# Patient Record
Sex: MALE | Race: WHITE | NOT HISPANIC OR LATINO | Employment: OTHER | URBAN - METROPOLITAN AREA
[De-identification: names, ages, dates, MRNs, and addresses within clinical notes are randomized per-mention and may not be internally consistent; named-entity substitution may affect disease eponyms.]

---

## 2017-06-20 ENCOUNTER — LAB (OUTPATIENT)
Dept: LAB | Facility: HOSPITAL | Age: 72
End: 2017-06-20

## 2017-06-20 ENCOUNTER — TRANSCRIBE ORDERS (OUTPATIENT)
Dept: LAB | Facility: HOSPITAL | Age: 72
End: 2017-06-20

## 2017-06-20 DIAGNOSIS — E78.2 MIXED HYPERLIPIDEMIA: ICD-10-CM

## 2017-06-20 DIAGNOSIS — E13.8 DIABETES MELLITUS OF OTHER TYPE WITH COMPLICATION: Primary | ICD-10-CM

## 2017-06-20 DIAGNOSIS — E13.8 DIABETES MELLITUS OF OTHER TYPE WITH COMPLICATION: ICD-10-CM

## 2017-06-20 LAB
ALBUMIN SERPL-MCNC: 4.2 G/DL (ref 3.2–4.8)
ALBUMIN/GLOB SERPL: 1.6 G/DL (ref 1.5–2.5)
ALP SERPL-CCNC: 58 U/L (ref 25–100)
ALT SERPL W P-5'-P-CCNC: 20 U/L (ref 7–40)
ANION GAP SERPL CALCULATED.3IONS-SCNC: 7 MMOL/L (ref 3–11)
ARTICHOKE IGE QN: 53 MG/DL (ref 0–130)
AST SERPL-CCNC: 17 U/L (ref 0–33)
BILIRUB SERPL-MCNC: 0.5 MG/DL (ref 0.3–1.2)
BUN BLD-MCNC: 22 MG/DL (ref 9–23)
BUN/CREAT SERPL: 24.4 (ref 7–25)
CALCIUM SPEC-SCNC: 9.4 MG/DL (ref 8.7–10.4)
CHLORIDE SERPL-SCNC: 110 MMOL/L (ref 99–109)
CHOLEST SERPL-MCNC: 122 MG/DL (ref 0–200)
CO2 SERPL-SCNC: 24 MMOL/L (ref 20–31)
CREAT BLD-MCNC: 0.9 MG/DL (ref 0.6–1.3)
GFR SERPL CREATININE-BSD FRML MDRD: 83 ML/MIN/1.73
GLOBULIN UR ELPH-MCNC: 2.6 GM/DL
GLUCOSE BLD-MCNC: 131 MG/DL (ref 70–100)
HBA1C MFR BLD: 7.2 % (ref 4.8–5.6)
HDLC SERPL-MCNC: 51 MG/DL (ref 40–60)
POTASSIUM BLD-SCNC: 4.4 MMOL/L (ref 3.5–5.5)
PROT SERPL-MCNC: 6.8 G/DL (ref 5.7–8.2)
SODIUM BLD-SCNC: 141 MMOL/L (ref 132–146)
T3RU NFR SERPL: 41.3 % (ref 23–37)
T4 FREE SERPL-MCNC: 1.22 NG/DL (ref 0.89–1.76)
T4 SERPL-MCNC: 7.3 MCG/DL (ref 4.7–11.4)
TRIGL SERPL-MCNC: 79 MG/DL (ref 0–150)
TSH SERPL DL<=0.05 MIU/L-ACNC: 0.12 MIU/ML (ref 0.35–5.35)

## 2017-06-20 PROCEDURE — 80053 COMPREHEN METABOLIC PANEL: CPT | Performed by: INTERNAL MEDICINE

## 2017-06-20 PROCEDURE — 80061 LIPID PANEL: CPT | Performed by: INTERNAL MEDICINE

## 2017-06-20 PROCEDURE — 84479 ASSAY OF THYROID (T3 OR T4): CPT | Performed by: INTERNAL MEDICINE

## 2017-06-20 PROCEDURE — 84443 ASSAY THYROID STIM HORMONE: CPT | Performed by: INTERNAL MEDICINE

## 2017-06-20 PROCEDURE — 83036 HEMOGLOBIN GLYCOSYLATED A1C: CPT | Performed by: INTERNAL MEDICINE

## 2017-06-20 PROCEDURE — 36415 COLL VENOUS BLD VENIPUNCTURE: CPT | Performed by: INTERNAL MEDICINE

## 2017-06-20 PROCEDURE — 84439 ASSAY OF FREE THYROXINE: CPT | Performed by: INTERNAL MEDICINE

## 2017-12-19 ENCOUNTER — TRANSCRIBE ORDERS (OUTPATIENT)
Dept: LAB | Facility: HOSPITAL | Age: 72
End: 2017-12-19

## 2017-12-19 ENCOUNTER — LAB (OUTPATIENT)
Dept: LAB | Facility: HOSPITAL | Age: 72
End: 2017-12-19
Attending: INTERNAL MEDICINE

## 2017-12-19 DIAGNOSIS — E11.9 DIABETES MELLITUS WITHOUT COMPLICATION (HCC): ICD-10-CM

## 2017-12-19 DIAGNOSIS — I10 ESSENTIAL HYPERTENSION, MALIGNANT: ICD-10-CM

## 2017-12-19 DIAGNOSIS — E78.2 MIXED HYPERLIPIDEMIA: ICD-10-CM

## 2017-12-19 DIAGNOSIS — E11.9 DIABETES MELLITUS WITHOUT COMPLICATION (HCC): Primary | ICD-10-CM

## 2017-12-19 LAB
ALBUMIN SERPL-MCNC: 4.2 G/DL (ref 3.2–4.8)
ALBUMIN/GLOB SERPL: 1.8 G/DL (ref 1.5–2.5)
ALP SERPL-CCNC: 86 U/L (ref 25–100)
ALT SERPL W P-5'-P-CCNC: 22 U/L (ref 7–40)
ANION GAP SERPL CALCULATED.3IONS-SCNC: 7 MMOL/L (ref 3–11)
ARTICHOKE IGE QN: 46 MG/DL (ref 0–130)
AST SERPL-CCNC: 18 U/L (ref 0–33)
BILIRUB SERPL-MCNC: 0.5 MG/DL (ref 0.3–1.2)
BUN BLD-MCNC: 22 MG/DL (ref 9–23)
BUN/CREAT SERPL: 24.4 (ref 7–25)
CALCIUM SPEC-SCNC: 9 MG/DL (ref 8.7–10.4)
CHLORIDE SERPL-SCNC: 110 MMOL/L (ref 99–109)
CHOLEST SERPL-MCNC: 108 MG/DL (ref 0–200)
CO2 SERPL-SCNC: 24 MMOL/L (ref 20–31)
CREAT BLD-MCNC: 0.9 MG/DL (ref 0.6–1.3)
GFR SERPL CREATININE-BSD FRML MDRD: 82 ML/MIN/1.73
GLOBULIN UR ELPH-MCNC: 2.4 GM/DL
GLUCOSE BLD-MCNC: 144 MG/DL (ref 70–100)
HBA1C MFR BLD: 7 % (ref 4.8–5.6)
HDLC SERPL-MCNC: 44 MG/DL (ref 40–60)
POTASSIUM BLD-SCNC: 4.8 MMOL/L (ref 3.5–5.5)
PROT SERPL-MCNC: 6.6 G/DL (ref 5.7–8.2)
SODIUM BLD-SCNC: 141 MMOL/L (ref 132–146)
T3RU NFR SERPL: 37 % (ref 23–37)
T4 FREE SERPL-MCNC: 1.13 NG/DL (ref 0.89–1.76)
T4 SERPL-MCNC: 7.6 MCG/DL (ref 4.7–11.4)
TRIGL SERPL-MCNC: 91 MG/DL (ref 0–150)
TSH SERPL DL<=0.05 MIU/L-ACNC: 0.35 MIU/ML (ref 0.35–5.35)

## 2017-12-19 PROCEDURE — 83036 HEMOGLOBIN GLYCOSYLATED A1C: CPT

## 2017-12-19 PROCEDURE — 84479 ASSAY OF THYROID (T3 OR T4): CPT

## 2017-12-19 PROCEDURE — 84443 ASSAY THYROID STIM HORMONE: CPT

## 2017-12-19 PROCEDURE — 36415 COLL VENOUS BLD VENIPUNCTURE: CPT

## 2017-12-19 PROCEDURE — 84439 ASSAY OF FREE THYROXINE: CPT

## 2017-12-19 PROCEDURE — 80053 COMPREHEN METABOLIC PANEL: CPT

## 2017-12-19 PROCEDURE — 80061 LIPID PANEL: CPT

## 2020-01-02 ENCOUNTER — LAB (OUTPATIENT)
Dept: LAB | Facility: HOSPITAL | Age: 75
End: 2020-01-02

## 2020-01-02 ENCOUNTER — TRANSCRIBE ORDERS (OUTPATIENT)
Dept: LAB | Facility: HOSPITAL | Age: 75
End: 2020-01-02

## 2020-01-02 DIAGNOSIS — I10 ESSENTIAL HYPERTENSION, MALIGNANT: ICD-10-CM

## 2020-01-02 DIAGNOSIS — E03.9 MYXEDEMA HEART DISEASE: ICD-10-CM

## 2020-01-02 DIAGNOSIS — E11.8 DIABETIC COMPLICATION (HCC): ICD-10-CM

## 2020-01-02 DIAGNOSIS — E78.2 MIXED HYPERLIPIDEMIA: ICD-10-CM

## 2020-01-02 DIAGNOSIS — E11.8 DIABETIC COMPLICATION (HCC): Primary | ICD-10-CM

## 2020-01-02 DIAGNOSIS — I51.9 MYXEDEMA HEART DISEASE: ICD-10-CM

## 2020-01-02 LAB
CHOLEST SERPL-MCNC: 121 MG/DL (ref 0–200)
HBA1C MFR BLD: 7.88 % (ref 4.8–5.6)
HDLC SERPL-MCNC: 54 MG/DL (ref 40–60)
LDLC SERPL CALC-MCNC: 48 MG/DL (ref 0–100)
LDLC/HDLC SERPL: 0.89 {RATIO}
T4 FREE SERPL-MCNC: 1.26 NG/DL (ref 0.93–1.7)
T4 SERPL-MCNC: 6.29 MCG/DL (ref 4.5–11.7)
TRIGL SERPL-MCNC: 96 MG/DL (ref 0–150)
TSH SERPL DL<=0.05 MIU/L-ACNC: 0.3 UIU/ML (ref 0.27–4.2)
VLDLC SERPL-MCNC: 19.2 MG/DL (ref 5–40)

## 2020-01-02 PROCEDURE — 83036 HEMOGLOBIN GLYCOSYLATED A1C: CPT

## 2020-01-02 PROCEDURE — 36415 COLL VENOUS BLD VENIPUNCTURE: CPT

## 2020-01-02 PROCEDURE — 84479 ASSAY OF THYROID (T3 OR T4): CPT

## 2020-01-02 PROCEDURE — 80061 LIPID PANEL: CPT

## 2020-01-02 PROCEDURE — 84443 ASSAY THYROID STIM HORMONE: CPT | Performed by: INTERNAL MEDICINE

## 2020-01-02 PROCEDURE — 84439 ASSAY OF FREE THYROXINE: CPT

## 2020-01-03 LAB — T3RU NFR SERPL: 29 % (ref 24–39)

## 2021-05-07 DIAGNOSIS — I10 ESSENTIAL HYPERTENSION, BENIGN: Primary | ICD-10-CM

## 2021-05-22 DIAGNOSIS — I10 ESSENTIAL HYPERTENSION, BENIGN: Primary | ICD-10-CM

## 2021-05-24 RX ORDER — LEVOTHYROXINE SODIUM 88 UG/1
TABLET ORAL
Qty: 90 TABLET | Refills: 3 | OUTPATIENT
Start: 2021-05-24

## 2021-05-24 RX ORDER — CLOPIDOGREL BISULFATE 75 MG/1
TABLET ORAL
Qty: 90 TABLET | Refills: 3 | Status: SHIPPED | OUTPATIENT
Start: 2021-05-24 | End: 2022-02-16 | Stop reason: SDUPTHER

## 2021-05-24 RX ORDER — ATORVASTATIN CALCIUM 20 MG/1
TABLET, FILM COATED ORAL
Qty: 90 TABLET | Refills: 3 | Status: SHIPPED | OUTPATIENT
Start: 2021-05-24 | End: 2022-02-16 | Stop reason: SDUPTHER

## 2021-07-23 DIAGNOSIS — E78.2 MIXED HYPERLIPIDEMIA: Primary | ICD-10-CM

## 2021-07-26 RX ORDER — LEVOTHYROXINE SODIUM 88 UG/1
TABLET ORAL
Qty: 30 TABLET | Refills: 0 | Status: SHIPPED | OUTPATIENT
Start: 2021-07-26 | End: 2022-03-01

## 2021-08-12 ENCOUNTER — OFFICE VISIT (OUTPATIENT)
Dept: CARDIOLOGY | Facility: CLINIC | Age: 76
End: 2021-08-12

## 2021-08-12 VITALS
BODY MASS INDEX: 23.95 KG/M2 | SYSTOLIC BLOOD PRESSURE: 150 MMHG | OXYGEN SATURATION: 98 % | WEIGHT: 158 LBS | TEMPERATURE: 97.1 F | HEART RATE: 70 BPM | HEIGHT: 68 IN | RESPIRATION RATE: 15 BRPM | DIASTOLIC BLOOD PRESSURE: 70 MMHG

## 2021-08-12 DIAGNOSIS — E11.9 TYPE 2 DIABETES MELLITUS WITHOUT COMPLICATION, WITHOUT LONG-TERM CURRENT USE OF INSULIN (HCC): ICD-10-CM

## 2021-08-12 DIAGNOSIS — E78.5 HYPERLIPIDEMIA LDL GOAL <70: ICD-10-CM

## 2021-08-12 DIAGNOSIS — I25.10 CAD S/P PERCUTANEOUS CORONARY ANGIOPLASTY: Primary | ICD-10-CM

## 2021-08-12 DIAGNOSIS — Z98.61 CAD S/P PERCUTANEOUS CORONARY ANGIOPLASTY: Primary | ICD-10-CM

## 2021-08-12 DIAGNOSIS — R09.89 RIGHT CAROTID BRUIT: ICD-10-CM

## 2021-08-12 DIAGNOSIS — I10 ESSENTIAL HYPERTENSION: ICD-10-CM

## 2021-08-12 PROCEDURE — 99214 OFFICE O/P EST MOD 30 MIN: CPT | Performed by: INTERNAL MEDICINE

## 2021-08-12 PROCEDURE — 93000 ELECTROCARDIOGRAM COMPLETE: CPT | Performed by: INTERNAL MEDICINE

## 2021-08-12 RX ORDER — ASPIRIN 81 MG/1
81 TABLET ORAL DAILY
COMMUNITY

## 2021-08-12 NOTE — PROGRESS NOTES
[facility] [place]  [facility] CARDIOLOGY  [address] [place] [address]  Dept: [phone]  Dept Fax: [phone]    Alen Culp  1945    Follow Up Office Visit Note    History of Present Illness:  Alen Culp is a 75 y.o. male who presents to the clinic for Follow-up. CAD- He has prior stents to RCA and lAD. Denies any complaints, walks 5 miles daily checks BP daily 110.60, here BP is 160, he states WCS, on ASA, Plavix and Metoprolol 12,5 bid     The following portions of the patient's history were reviewed and updated as appropriate: allergies, current medications, past family history, past medical history, past social history, past surgical history and problem list.    Medications:  aspirin  atorvastatin  clopidogrel  levothyroxine  metFORMIN  metoprolol tartrate    Subjective  No Known Allergies     Past Medical History:   Diagnosis Date   • CAD (coronary artery disease)    • Coronary arteriosclerosis in native artery    • Diabetes (CMS/HCC)    • DJD (degenerative joint disease)    • ED (erectile dysfunction)    • Hyperlipidemia        Past Surgical History:   Procedure Laterality Date   • APPENDECTOMY         Family History   Problem Relation Age of Onset   • Coronary artery disease Father    • Diabetes Father    • Hypertension Father    • Coronary artery disease Other    • Lung cancer Other         Social History     Socioeconomic History   • Marital status:      Spouse name: Not on file   • Number of children: Not on file   • Years of education: Not on file   • Highest education level: Not on file   Tobacco Use   • Smoking status: Former Smoker     Types: Cigarettes   • Smokeless tobacco: Never Used   Substance and Sexual Activity   • Alcohol use: Yes   • Drug use: Never   • Sexual activity: Defer       Review of Systems   Constitutional: Negative.    HENT: Negative.    Respiratory: Negative.    Cardiovascular: Negative.    Endocrine: Negative.    Genitourinary:  "Negative.    Musculoskeletal: Negative.    Skin: Negative.    Allergic/Immunologic: Negative.    Neurological: Negative.    Hematological: Negative.    Psychiatric/Behavioral: Negative.    All other systems reviewed and are negative.      Cardiovascular Procedures    ECHO/MUGA:   STRESS TESTS:   CARDIAC CATH:   DEVICES:   HOLTER:   CT/MRI:   VASCULAR:   CARDIOTHORACIC:     Objective  Vitals:    08/12/21 1057   BP: 150/70   BP Location: Left arm   Patient Position: Sitting   Cuff Size: Adult   Pulse: 70   Resp: 15   Temp: 97.1 °F (36.2 °C)   TempSrc: Infrared   SpO2: 98%   Weight: 71.7 kg (158 lb)   Height: 172.7 cm (68\")   PainSc: 0-No pain     Body mass index is 24.02 kg/m².     Physical Exam  Constitutional:       Appearance: Healthy appearance. Not in distress.   Neck:      Vascular: No JVR. JVD normal.   Pulmonary:      Effort: Pulmonary effort is normal.      Breath sounds: Normal breath sounds. No wheezing. No rhonchi. No rales.   Chest:      Chest wall: Not tender to palpatation.   Cardiovascular:      PMI at left midclavicular line. Normal rate. Regular rhythm. Normal S1. Normal S2.      Murmurs: There is no murmur.      No gallop. No click. No rub.   Pulses:     Intact distal pulses.   Edema:     Peripheral edema absent.   Abdominal:      General: Bowel sounds are normal.      Palpations: Abdomen is soft.      Tenderness: There is no abdominal tenderness.   Musculoskeletal: Normal range of motion.         General: No tenderness. Skin:     General: Skin is warm and dry.   Neurological:      General: No focal deficit present.      Mental Status: Alert and oriented to person, place and time.          Diagnostic Data    ECG 12 Lead    Date/Time: 8/12/2021 12:07 PM  Performed by: Sundeep Cooper MD  Authorized by: Sundeep Cooper MD   Previous ECG: no previous ECG available  Rhythm: sinus bradycardia  Rate: normal  BPM: 52  QRS axis: normal    Clinical impression: abnormal EKG            Assessment " and Plan  Diagnoses and all orders for this visit:    CAD S/P percutaneous coronary angioplasty-No complaints on ASA, Plavix, prior stents , will assess the ef with echo  -     Comprehensive Metabolic Panel  -     CBC & Differential  -     Lipid Panel  -     Adult Transthoracic Echo Complete W/ Cont if Necessary Per Protocol; Future    Essential hypertension-The Bp here is high at work normal, WCS? pnly on Metoprolol 12,5 bid HR is slow 52    Hyperlipidemia LDL goal <70  -     Lipid Panel    Type 2 diabetes mellitus without complication, without long-term current use of insulin (CMS/[facility])    Right carotid bruit- will get a carotid doppler   -     Duplex Carotid Ultrasound CAR; Future    Other orders  -     aspirin 81 MG EC tablet; Take 81 mg by mouth Daily.  -     metFORMIN (GLUCOPHAGE) 500 MG tablet; Take 500 mg by mouth 2 (Two) Times a Day With Meals.         Return in about 6 months (around 2/12/2022) for Recheck.    Sundeep Cooper MD  08/12/2021

## 2021-08-13 ENCOUNTER — TELEPHONE (OUTPATIENT)
Dept: CARDIOLOGY | Facility: CLINIC | Age: 76
End: 2021-08-13

## 2021-08-13 LAB
ALBUMIN SERPL-MCNC: 4.5 G/DL (ref 3.7–4.7)
ALBUMIN/GLOB SERPL: 1.9 {RATIO} (ref 1.2–2.2)
ALP SERPL-CCNC: 97 IU/L (ref 48–121)
ALT SERPL-CCNC: 11 IU/L (ref 0–44)
AST SERPL-CCNC: 14 IU/L (ref 0–40)
BASOPHILS # BLD AUTO: 0 X10E3/UL (ref 0–0.2)
BASOPHILS NFR BLD AUTO: 1 %
BILIRUB SERPL-MCNC: 0.3 MG/DL (ref 0–1.2)
BUN SERPL-MCNC: 20 MG/DL (ref 8–27)
BUN/CREAT SERPL: 22 (ref 10–24)
CALCIUM SERPL-MCNC: 9.4 MG/DL (ref 8.6–10.2)
CHLORIDE SERPL-SCNC: 107 MMOL/L (ref 96–106)
CHOLEST SERPL-MCNC: 107 MG/DL (ref 100–199)
CO2 SERPL-SCNC: 22 MMOL/L (ref 20–29)
CREAT SERPL-MCNC: 0.92 MG/DL (ref 0.76–1.27)
EOSINOPHIL # BLD AUTO: 0.1 X10E3/UL (ref 0–0.4)
EOSINOPHIL NFR BLD AUTO: 1 %
ERYTHROCYTE [DISTWIDTH] IN BLOOD BY AUTOMATED COUNT: 12.5 % (ref 11.6–15.4)
GLOBULIN SER CALC-MCNC: 2.4 G/DL (ref 1.5–4.5)
GLUCOSE SERPL-MCNC: 120 MG/DL (ref 65–99)
HCT VFR BLD AUTO: 39.4 % (ref 37.5–51)
HDLC SERPL-MCNC: 53 MG/DL
HGB BLD-MCNC: 12.9 G/DL (ref 13–17.7)
IMM GRANULOCYTES # BLD AUTO: 0 X10E3/UL (ref 0–0.1)
IMM GRANULOCYTES NFR BLD AUTO: 0 %
LDLC SERPL CALC-MCNC: 35 MG/DL (ref 0–99)
LYMPHOCYTES # BLD AUTO: 1.4 X10E3/UL (ref 0.7–3.1)
LYMPHOCYTES NFR BLD AUTO: 18 %
MCH RBC QN AUTO: 31.5 PG (ref 26.6–33)
MCHC RBC AUTO-ENTMCNC: 32.7 G/DL (ref 31.5–35.7)
MCV RBC AUTO: 96 FL (ref 79–97)
MONOCYTES # BLD AUTO: 0.6 X10E3/UL (ref 0.1–0.9)
MONOCYTES NFR BLD AUTO: 8 %
NEUTROPHILS # BLD AUTO: 5.6 X10E3/UL (ref 1.4–7)
NEUTROPHILS NFR BLD AUTO: 72 %
PLATELET # BLD AUTO: 175 X10E3/UL (ref 150–450)
POTASSIUM SERPL-SCNC: 4.8 MMOL/L (ref 3.5–5.2)
PROT SERPL-MCNC: 6.9 G/DL (ref 6–8.5)
RBC # BLD AUTO: 4.09 X10E6/UL (ref 4.14–5.8)
SODIUM SERPL-SCNC: 142 MMOL/L (ref 134–144)
TRIGL SERPL-MCNC: 99 MG/DL (ref 0–149)
VLDLC SERPL CALC-MCNC: 19 MG/DL (ref 5–40)
WBC # BLD AUTO: 7.7 X10E3/UL (ref 3.4–10.8)

## 2021-08-13 NOTE — TELEPHONE ENCOUNTER
Lab is good but he is a little anemic needs to talk to PCP, please fax the lab report to PCP       Faxed to pcp and sent letter to pt . Pt wants copy of it . Pt will call pcp

## 2021-08-16 ENCOUNTER — TELEPHONE (OUTPATIENT)
Dept: CARDIOLOGY | Facility: CLINIC | Age: 76
End: 2021-08-16

## 2021-08-31 ENCOUNTER — TELEPHONE (OUTPATIENT)
Dept: CARDIOLOGY | Facility: CLINIC | Age: 76
End: 2021-08-31

## 2021-08-31 NOTE — TELEPHONE ENCOUNTER
Carotid less than 50% Right carotid and echo normal Ef normal power 65%  .     PT SAID THANK YOU AND HE WAS PLEASED WITH THESE RESULTS.

## 2022-02-16 ENCOUNTER — OFFICE VISIT (OUTPATIENT)
Dept: CARDIOLOGY | Facility: CLINIC | Age: 77
End: 2022-02-16

## 2022-02-16 VITALS
HEIGHT: 68 IN | RESPIRATION RATE: 15 BRPM | BODY MASS INDEX: 24.4 KG/M2 | WEIGHT: 161 LBS | SYSTOLIC BLOOD PRESSURE: 102 MMHG | HEART RATE: 72 BPM | OXYGEN SATURATION: 97 % | DIASTOLIC BLOOD PRESSURE: 58 MMHG | TEMPERATURE: 97.1 F

## 2022-02-16 DIAGNOSIS — Z98.61 CAD S/P PERCUTANEOUS CORONARY ANGIOPLASTY: Primary | ICD-10-CM

## 2022-02-16 DIAGNOSIS — I25.10 CAD S/P PERCUTANEOUS CORONARY ANGIOPLASTY: Primary | ICD-10-CM

## 2022-02-16 DIAGNOSIS — E78.5 HYPERLIPIDEMIA LDL GOAL <70: ICD-10-CM

## 2022-02-16 DIAGNOSIS — E11.9 TYPE 2 DIABETES MELLITUS WITHOUT COMPLICATION, WITHOUT LONG-TERM CURRENT USE OF INSULIN: ICD-10-CM

## 2022-02-16 DIAGNOSIS — I10 ESSENTIAL HYPERTENSION, BENIGN: ICD-10-CM

## 2022-02-16 DIAGNOSIS — I65.21 CAROTID STENOSIS, ASYMPTOMATIC, RIGHT: ICD-10-CM

## 2022-02-16 PROBLEM — R09.89 RIGHT CAROTID BRUIT: Status: RESOLVED | Noted: 2021-08-12 | Resolved: 2022-02-16

## 2022-02-16 PROCEDURE — 99214 OFFICE O/P EST MOD 30 MIN: CPT | Performed by: INTERNAL MEDICINE

## 2022-02-16 RX ORDER — LOSARTAN POTASSIUM 25 MG/1
25 TABLET ORAL DAILY
Qty: 90 TABLET | Refills: 2 | Status: SHIPPED | OUTPATIENT
Start: 2022-02-16 | End: 2022-09-21

## 2022-02-16 RX ORDER — CLOPIDOGREL BISULFATE 75 MG/1
75 TABLET ORAL DAILY
Qty: 90 TABLET | Refills: 3 | Status: SHIPPED | OUTPATIENT
Start: 2022-02-16 | End: 2023-01-16

## 2022-02-16 RX ORDER — ATORVASTATIN CALCIUM 20 MG/1
20 TABLET, FILM COATED ORAL DAILY
Qty: 90 TABLET | Refills: 3 | Status: SHIPPED | OUTPATIENT
Start: 2022-02-16 | End: 2023-01-16

## 2022-02-16 NOTE — PROGRESS NOTES
[facility] [place]  [facility] CARDIOLOGY  [address] [place] [address]  Dept: [phone]  Dept Fax: [phone]    Alen Culp  1945    Follow Up Office Visit Note    History of Present Illness:  Alen Culp is a 76 y.o. male who presents to the clinic for Follow-up. CAD- He denies any chest pain, SOB, no palpitations, prior stents to RCA and also LAD on ASA and also Plavix, , will keep same meds    The following portions of the patient's history were reviewed and updated as appropriate: allergies, current medications, past family history, past medical history, past social history, past surgical history and problem list.    Medications:  aspirin  atorvastatin  clopidogrel  GNP B-COMPLEX PLUS VITAMIN C PO  levothyroxine  metFORMIN  metoprolol tartrate    Subjective  No Known Allergies     Past Medical History:   Diagnosis Date   • CAD (coronary artery disease)    • Coronary arteriosclerosis in native artery    • Diabetes (HCC)    • DJD (degenerative joint disease)    • ED (erectile dysfunction)    • Hyperlipidemia        Past Surgical History:   Procedure Laterality Date   • APPENDECTOMY         Family History   Problem Relation Age of Onset   • Coronary artery disease Father    • Diabetes Father    • Hypertension Father    • Coronary artery disease Other    • Lung cancer Other         Social History     Socioeconomic History   • Marital status:    Tobacco Use   • Smoking status: Former Smoker     Types: Cigarettes   • Smokeless tobacco: Never Used   Substance and Sexual Activity   • Alcohol use: Yes   • Drug use: Never   • Sexual activity: Defer       Review of Systems   Constitutional: Negative.    HENT: Negative.    Respiratory: Negative.    Cardiovascular: Negative.    Endocrine: Negative.    Genitourinary: Negative.    Musculoskeletal: Negative.    Skin: Negative.    Allergic/Immunologic: Negative.    Neurological: Negative.    Hematological: Negative.   "  Psychiatric/Behavioral: Negative.        Cardiovascular Procedures    ECHO/MUGA:   STRESS TESTS:   CARDIAC CATH:   DEVICES:   HOLTER:   CT/MRI:   VASCULAR:   CARDIOTHORACIC:     Objective  Vitals:    02/16/22 1108   BP: 102/58   BP Location: Right arm   Patient Position: Sitting   Cuff Size: Adult   Pulse: 72   Resp: 15   Temp: 97.1 °F (36.2 °C)   TempSrc: Infrared   SpO2: 97%   Weight: 73 kg (161 lb)   Height: 172.7 cm (68\")   PainSc: 0-No pain     Body mass index is 24.48 kg/m².     Physical Exam  Constitutional:       Appearance: Healthy appearance. Not in distress.   Neck:      Vascular: No JVR. JVD normal.   Pulmonary:      Effort: Pulmonary effort is normal.      Breath sounds: Normal breath sounds. No wheezing. No rhonchi. No rales.   Chest:      Chest wall: Not tender to palpatation.   Cardiovascular:      PMI at left midclavicular line. Normal rate. Regular rhythm. Normal S1. Normal S2.      Murmurs: There is no murmur.      No gallop. No click. No rub.   Pulses:     Intact distal pulses.   Edema:     Peripheral edema absent.   Abdominal:      General: Bowel sounds are normal.      Palpations: Abdomen is soft.      Tenderness: There is no abdominal tenderness.   Musculoskeletal: Normal range of motion.         General: No tenderness. Skin:     General: Skin is warm and dry.   Neurological:      General: No focal deficit present.      Mental Status: Alert and oriented to person, place and time.          Diagnostic Data  Procedures    Assessment and Plan  Diagnoses and all orders for this visit:    CAD S/P percutaneous coronary angioplasty- No chest pain, -No chest pain, on Asa, Plavix, prior stents     Essential hypertension- the BP is elevated here at home is fine 140.70 will start low dose Losartan 25 mg, keep Metoprolol 12,5 bid    Hyperlipidemia LDL goal <70- On Lipitor 20 mg, tolerating well, will get a Lipid today    Type 2 diabetes mellitus without complication, without long-term current use of " insulin (HCC)    Carotid stenosis- 40% LINK on ASA    Other orders  -     B Complex-C-Calcium (GNP B-COMPLEX PLUS VITAMIN C PO); Take  by mouth.         No follow-ups on file.    Sundeep Cooper MD  02/16/2022

## 2022-02-17 ENCOUNTER — TELEPHONE (OUTPATIENT)
Dept: CARDIOLOGY | Facility: CLINIC | Age: 77
End: 2022-02-17

## 2022-02-17 LAB
ALBUMIN SERPL-MCNC: 4.5 G/DL (ref 3.7–4.7)
ALBUMIN/GLOB SERPL: 1.8 {RATIO} (ref 1.2–2.2)
ALP SERPL-CCNC: 80 IU/L (ref 44–121)
ALT SERPL-CCNC: 15 IU/L (ref 0–44)
AST SERPL-CCNC: 18 IU/L (ref 0–40)
BASOPHILS # BLD AUTO: 0 X10E3/UL (ref 0–0.2)
BASOPHILS NFR BLD AUTO: 0 %
BILIRUB SERPL-MCNC: 0.5 MG/DL (ref 0–1.2)
BUN SERPL-MCNC: 20 MG/DL (ref 8–27)
BUN/CREAT SERPL: 18 (ref 10–24)
CALCIUM SERPL-MCNC: 9.3 MG/DL (ref 8.6–10.2)
CHLORIDE SERPL-SCNC: 104 MMOL/L (ref 96–106)
CHOLEST SERPL-MCNC: 118 MG/DL (ref 100–199)
CO2 SERPL-SCNC: 20 MMOL/L (ref 20–29)
CREAT SERPL-MCNC: 1.14 MG/DL (ref 0.76–1.27)
EOSINOPHIL # BLD AUTO: 0.1 X10E3/UL (ref 0–0.4)
EOSINOPHIL NFR BLD AUTO: 1 %
ERYTHROCYTE [DISTWIDTH] IN BLOOD BY AUTOMATED COUNT: 11.9 % (ref 11.6–15.4)
GLOBULIN SER CALC-MCNC: 2.5 G/DL (ref 1.5–4.5)
GLUCOSE SERPL-MCNC: 126 MG/DL (ref 65–99)
HBA1C MFR BLD: 7.7 % (ref 4.8–5.6)
HCT VFR BLD AUTO: 38.4 % (ref 37.5–51)
HDLC SERPL-MCNC: 57 MG/DL
HGB BLD-MCNC: 12.9 G/DL (ref 13–17.7)
IMM GRANULOCYTES # BLD AUTO: 0 X10E3/UL (ref 0–0.1)
IMM GRANULOCYTES NFR BLD AUTO: 0 %
LDLC SERPL CALC-MCNC: 44 MG/DL (ref 0–99)
LYMPHOCYTES # BLD AUTO: 1.1 X10E3/UL (ref 0.7–3.1)
LYMPHOCYTES NFR BLD AUTO: 18 %
MCH RBC QN AUTO: 32 PG (ref 26.6–33)
MCHC RBC AUTO-ENTMCNC: 33.6 G/DL (ref 31.5–35.7)
MCV RBC AUTO: 95 FL (ref 79–97)
MONOCYTES # BLD AUTO: 0.6 X10E3/UL (ref 0.1–0.9)
MONOCYTES NFR BLD AUTO: 9 %
NEUTROPHILS # BLD AUTO: 4.4 X10E3/UL (ref 1.4–7)
NEUTROPHILS NFR BLD AUTO: 72 %
PLATELET # BLD AUTO: 171 X10E3/UL (ref 150–450)
POTASSIUM SERPL-SCNC: 4.7 MMOL/L (ref 3.5–5.2)
PROT SERPL-MCNC: 7 G/DL (ref 6–8.5)
RBC # BLD AUTO: 4.03 X10E6/UL (ref 4.14–5.8)
SODIUM SERPL-SCNC: 141 MMOL/L (ref 134–144)
TRIGL SERPL-MCNC: 91 MG/DL (ref 0–149)
VLDLC SERPL CALC-MCNC: 17 MG/DL (ref 5–40)
WBC # BLD AUTO: 6.2 X10E3/UL (ref 3.4–10.8)

## 2022-02-17 NOTE — TELEPHONE ENCOUNTER
Advised patient that his lab work was normal and lipids were good.  Dr. Cooper said to just continue your medications.  I advised him I was mailing out his results today. He verbalized understanding.

## 2022-02-17 NOTE — TELEPHONE ENCOUNTER
----- Message from Sundeep Cooper MD sent at 2/17/2022 12:03 PM EST -----  Lipids are great. Keep your meds

## 2022-02-22 NOTE — TELEPHONE ENCOUNTER
Per Mr. Culp his Metformin was sent in wrong and should have been 500mg and takes 2 pill twice a day for a total of 1000mg bid.  He said he has been on this dose for years.  I advised patient I would correct this on his medication list and send in correct prescription. Pt verbalized understanding.

## 2022-02-22 NOTE — TELEPHONE ENCOUNTER
HIS metFORMIN (GLUCOPHAGE) 500 MG tablet    WAS SENT IN WRONG    IT SHOULD SAY TAKE 2 TABLETS TWICE A DAY      HE'S BEEN TAKING THAT FOR YEARS    HE IS SHORT NOW      PLEASE ADVISE

## 2022-03-01 DIAGNOSIS — E78.2 MIXED HYPERLIPIDEMIA: ICD-10-CM

## 2022-03-01 RX ORDER — LEVOTHYROXINE SODIUM 88 UG/1
88 TABLET ORAL DAILY
Qty: 30 TABLET | Refills: 6 | Status: SHIPPED | OUTPATIENT
Start: 2022-03-01 | End: 2022-10-26 | Stop reason: SDUPTHER

## 2022-08-17 ENCOUNTER — OFFICE VISIT (OUTPATIENT)
Dept: CARDIOLOGY | Facility: CLINIC | Age: 77
End: 2022-08-17

## 2022-08-17 VITALS
HEART RATE: 68 BPM | DIASTOLIC BLOOD PRESSURE: 80 MMHG | BODY MASS INDEX: 23.04 KG/M2 | TEMPERATURE: 99 F | RESPIRATION RATE: 18 BRPM | OXYGEN SATURATION: 99 % | WEIGHT: 152 LBS | SYSTOLIC BLOOD PRESSURE: 168 MMHG | HEIGHT: 68 IN

## 2022-08-17 DIAGNOSIS — I25.10 CAD S/P PERCUTANEOUS CORONARY ANGIOPLASTY: Primary | ICD-10-CM

## 2022-08-17 DIAGNOSIS — I65.21 CAROTID STENOSIS, ASYMPTOMATIC, RIGHT: ICD-10-CM

## 2022-08-17 DIAGNOSIS — Z98.61 CAD S/P PERCUTANEOUS CORONARY ANGIOPLASTY: Primary | ICD-10-CM

## 2022-08-17 DIAGNOSIS — I10 ESSENTIAL HYPERTENSION: ICD-10-CM

## 2022-08-17 DIAGNOSIS — E11.9 TYPE 2 DIABETES MELLITUS WITHOUT COMPLICATION, WITHOUT LONG-TERM CURRENT USE OF INSULIN: ICD-10-CM

## 2022-08-17 DIAGNOSIS — E78.5 HYPERLIPIDEMIA LDL GOAL <70: ICD-10-CM

## 2022-08-17 PROCEDURE — 99214 OFFICE O/P EST MOD 30 MIN: CPT | Performed by: INTERNAL MEDICINE

## 2022-08-17 PROCEDURE — 93000 ELECTROCARDIOGRAM COMPLETE: CPT | Performed by: INTERNAL MEDICINE

## 2022-08-17 NOTE — PROGRESS NOTES
[facility] [place]  [facility] CARDIOLOGY  [address] [place] [address]  Dept: [phone]  Dept Fax: [phone]    Alen Culp  1945    Follow Up Office Visit Note    History of Present Illness:  Alen Culp is a 76 y.o. male who presents to the clinic for Follow-up. CAD- No chest pain, no SOB, no edema, BP is  Elevated here but at home and also 3 party is normal, BP is 160.80, he has Prior stent to LAD and also RCA in 2004, on ASA and Plavix, plus Losartan 25 mg and Metoprolol 12,5 bid, will keep same approach EKG sinus HR 68    The following portions of the patient's history were reviewed and updated as appropriate: allergies, current medications, past family history, past medical history, past social history, past surgical history and problem list.    Medications:  aspirin  atorvastatin  clopidogrel  GNP B-COMPLEX PLUS VITAMIN C PO  levothyroxine  losartan  metFORMIN  metoprolol tartrate    Subjective  No Known Allergies     Past Medical History:   Diagnosis Date   • CAD (coronary artery disease)    • Coronary arteriosclerosis in native artery    • Diabetes (HCC)    • DJD (degenerative joint disease)    • ED (erectile dysfunction)    • Hyperlipidemia        Past Surgical History:   Procedure Laterality Date   • APPENDECTOMY         Family History   Problem Relation Age of Onset   • Coronary artery disease Father    • Diabetes Father    • Hypertension Father    • Coronary artery disease Other    • Lung cancer Other         Social History     Socioeconomic History   • Marital status:    Tobacco Use   • Smoking status: Former Smoker     Types: Cigarettes   • Smokeless tobacco: Never Used   Vaping Use   • Vaping Use: Never used   Substance and Sexual Activity   • Alcohol use: Yes   • Drug use: Never   • Sexual activity: Defer       Review of Systems   Constitutional: Negative.    HENT: Negative.    Respiratory: Negative.    Cardiovascular: Negative.    Endocrine:  "Negative.    Genitourinary: Negative.    Musculoskeletal: Negative.    Skin: Negative.    Allergic/Immunologic: Negative.    Neurological: Negative.    Hematological: Negative.    Psychiatric/Behavioral: Negative.        Cardiovascular Procedures    ECHO/MUGA:   STRESS TESTS:   CARDIAC CATH:   DEVICES:   HOLTER:   CT/MRI:   VASCULAR:   CARDIOTHORACIC:     Objective  Vitals:    08/17/22 0825   BP: 168/80   BP Location: Left arm   Patient Position: Lying   Cuff Size: Adult   Pulse: 68   Resp: 18   Temp: 99 °F (37.2 °C)   TempSrc: Infrared   SpO2: 99%   Weight: 68.9 kg (152 lb)   Height: 172.7 cm (68\")   PainSc: 0-No pain     Body mass index is 23.11 kg/m².     Physical Exam  Constitutional:       Appearance: Healthy appearance. Not in distress.   Neck:      Vascular: No JVR. JVD normal.   Pulmonary:      Effort: Pulmonary effort is normal.      Breath sounds: Normal breath sounds. No wheezing. No rhonchi. No rales.   Chest:      Chest wall: Not tender to palpatation.   Cardiovascular:      PMI at left midclavicular line. Normal rate. Regular rhythm. Normal S1. Normal S2.      Murmurs: There is no murmur.      No gallop. No click. No rub.   Pulses:     Intact distal pulses.   Edema:     Peripheral edema absent.   Abdominal:      General: Bowel sounds are normal.      Palpations: Abdomen is soft.      Tenderness: There is no abdominal tenderness.   Musculoskeletal: Normal range of motion.         General: No tenderness. Skin:     General: Skin is warm and dry.   Neurological:      General: No focal deficit present.      Mental Status: Alert and oriented to person, place and time.          Diagnostic Data    ECG 12 Lead    Date/Time: 8/17/2022 8:54 AM  Performed by: Sundeep Cooper MD  Authorized by: Sundeep Cooper MD   Comparison: compared with previous ECG from 8/12/2021  Similar to previous ECG  Rhythm: sinus rhythm  Rate: normal  BPM: 68  QRS axis: normal    Clinical impression: normal " ECG            Assessment and Plan  Diagnoses and all orders for this visit:    CAD S/P percutaneous coronary intervention - in 2004 to LAD and RCA, no complaints, on ASA, Plavix  -     CBC & Differential  -     Comprehensive Metabolic Panel  -     CK    Essential hypertension- BP at home and also 3 party BP is good   -     TSH    Hyperlipidemia LDL goal <70- On Lipitor 20 mg, LDl is great   -     CK  -     TSH    Type 2 diabetes mellitus without complication, without long-term current use of insulin (HCC)  -     Hemoglobin A1c    Carotid stenosis, asymptomatic, right- Mild disease by recent carotid doppler, he has bilateral carotid bruit           Return in about 6 months (around 2/17/2023) for Recheck.    Sundeep Cooper MD  08/17/2022

## 2022-08-18 ENCOUNTER — TELEPHONE (OUTPATIENT)
Dept: CARDIOLOGY | Facility: CLINIC | Age: 77
End: 2022-08-18

## 2022-08-18 LAB
ALBUMIN SERPL-MCNC: 4.5 G/DL (ref 3.7–4.7)
ALBUMIN/GLOB SERPL: 1.9 {RATIO} (ref 1.2–2.2)
ALP SERPL-CCNC: 73 IU/L (ref 44–121)
ALT SERPL-CCNC: 14 IU/L (ref 0–44)
AST SERPL-CCNC: 18 IU/L (ref 0–40)
BASOPHILS # BLD AUTO: 0 X10E3/UL (ref 0–0.2)
BASOPHILS NFR BLD AUTO: 1 %
BILIRUB SERPL-MCNC: 0.5 MG/DL (ref 0–1.2)
BUN SERPL-MCNC: 16 MG/DL (ref 8–27)
BUN/CREAT SERPL: 18 (ref 10–24)
CALCIUM SERPL-MCNC: 9.5 MG/DL (ref 8.6–10.2)
CHLORIDE SERPL-SCNC: 106 MMOL/L (ref 96–106)
CK SERPL-CCNC: 88 U/L (ref 41–331)
CO2 SERPL-SCNC: 22 MMOL/L (ref 20–29)
CREAT SERPL-MCNC: 0.88 MG/DL (ref 0.76–1.27)
EGFRCR-CYS SERPLBLD CKD-EPI 2021: 88 ML/MIN/1.73
EOSINOPHIL # BLD AUTO: 0.1 X10E3/UL (ref 0–0.4)
EOSINOPHIL NFR BLD AUTO: 1 %
ERYTHROCYTE [DISTWIDTH] IN BLOOD BY AUTOMATED COUNT: 12 % (ref 11.6–15.4)
GLOBULIN SER CALC-MCNC: 2.4 G/DL (ref 1.5–4.5)
GLUCOSE SERPL-MCNC: 132 MG/DL (ref 65–99)
HBA1C MFR BLD: 7 % (ref 4.8–5.6)
HCT VFR BLD AUTO: 38 % (ref 37.5–51)
HGB BLD-MCNC: 12.8 G/DL (ref 13–17.7)
IMM GRANULOCYTES # BLD AUTO: 0 X10E3/UL (ref 0–0.1)
IMM GRANULOCYTES NFR BLD AUTO: 0 %
LYMPHOCYTES # BLD AUTO: 1 X10E3/UL (ref 0.7–3.1)
LYMPHOCYTES NFR BLD AUTO: 19 %
MCH RBC QN AUTO: 32.7 PG (ref 26.6–33)
MCHC RBC AUTO-ENTMCNC: 33.7 G/DL (ref 31.5–35.7)
MCV RBC AUTO: 97 FL (ref 79–97)
MONOCYTES # BLD AUTO: 0.6 X10E3/UL (ref 0.1–0.9)
MONOCYTES NFR BLD AUTO: 11 %
NEUTROPHILS # BLD AUTO: 3.6 X10E3/UL (ref 1.4–7)
NEUTROPHILS NFR BLD AUTO: 68 %
PLATELET # BLD AUTO: 174 X10E3/UL (ref 150–450)
POTASSIUM SERPL-SCNC: 4.5 MMOL/L (ref 3.5–5.2)
PROT SERPL-MCNC: 6.9 G/DL (ref 6–8.5)
RBC # BLD AUTO: 3.92 X10E6/UL (ref 4.14–5.8)
SODIUM SERPL-SCNC: 143 MMOL/L (ref 134–144)
TSH SERPL DL<=0.005 MIU/L-ACNC: 0.06 UIU/ML (ref 0.45–4.5)
WBC # BLD AUTO: 5.3 X10E3/UL (ref 3.4–10.8)

## 2022-08-18 NOTE — TELEPHONE ENCOUNTER
----- Message from Sundeep Cooper MD sent at 8/18/2022 12:59 PM EDT -----  Please call your MD the TSH is low 0.064

## 2022-08-22 ENCOUNTER — TELEPHONE (OUTPATIENT)
Dept: CARDIOLOGY | Facility: CLINIC | Age: 77
End: 2022-08-22

## 2022-08-22 NOTE — TELEPHONE ENCOUNTER
Spoke with Mr. Culp and advised him his TSH, thyroid level was low and he needs to f/u with DR. Malhotra.  I have sent the lab results to him.  A1C is 7.0 about the same it has run for 5 years.  All other labs wnl.

## 2022-09-21 RX ORDER — LOSARTAN POTASSIUM 25 MG/1
25 TABLET ORAL DAILY
Qty: 90 TABLET | Refills: 3 | Status: SHIPPED | OUTPATIENT
Start: 2022-09-21

## 2022-10-26 DIAGNOSIS — E78.2 MIXED HYPERLIPIDEMIA: ICD-10-CM

## 2022-10-26 RX ORDER — LEVOTHYROXINE SODIUM 88 UG/1
88 TABLET ORAL DAILY
Qty: 90 TABLET | Refills: 0 | Status: SHIPPED | OUTPATIENT
Start: 2022-10-26 | End: 2023-03-15 | Stop reason: SDUPTHER

## 2023-01-14 DIAGNOSIS — I10 ESSENTIAL HYPERTENSION, BENIGN: ICD-10-CM

## 2023-01-16 RX ORDER — ATORVASTATIN CALCIUM 20 MG/1
20 TABLET, FILM COATED ORAL DAILY
Qty: 90 TABLET | Refills: 3 | Status: SHIPPED | OUTPATIENT
Start: 2023-01-16

## 2023-01-16 RX ORDER — CLOPIDOGREL BISULFATE 75 MG/1
75 TABLET ORAL DAILY
Qty: 90 TABLET | Refills: 3 | Status: SHIPPED | OUTPATIENT
Start: 2023-01-16

## 2023-02-21 ENCOUNTER — OFFICE VISIT (OUTPATIENT)
Dept: CARDIOLOGY | Facility: CLINIC | Age: 78
End: 2023-02-21
Payer: MEDICARE

## 2023-02-21 VITALS
DIASTOLIC BLOOD PRESSURE: 58 MMHG | OXYGEN SATURATION: 98 % | TEMPERATURE: 97.8 F | SYSTOLIC BLOOD PRESSURE: 102 MMHG | WEIGHT: 156 LBS | HEART RATE: 77 BPM | RESPIRATION RATE: 18 BRPM | HEIGHT: 68 IN | BODY MASS INDEX: 23.64 KG/M2

## 2023-02-21 DIAGNOSIS — E11.9 TYPE 2 DIABETES MELLITUS WITHOUT COMPLICATION, WITHOUT LONG-TERM CURRENT USE OF INSULIN: ICD-10-CM

## 2023-02-21 DIAGNOSIS — I10 ESSENTIAL HYPERTENSION: ICD-10-CM

## 2023-02-21 DIAGNOSIS — I65.21 CAROTID STENOSIS, ASYMPTOMATIC, RIGHT: ICD-10-CM

## 2023-02-21 DIAGNOSIS — E03.9 HYPOTHYROIDISM (ACQUIRED): ICD-10-CM

## 2023-02-21 DIAGNOSIS — E78.5 HYPERLIPIDEMIA LDL GOAL <70: ICD-10-CM

## 2023-02-21 DIAGNOSIS — I25.10 CAD S/P PERCUTANEOUS CORONARY ANGIOPLASTY: Primary | ICD-10-CM

## 2023-02-21 DIAGNOSIS — Z98.61 CAD S/P PERCUTANEOUS CORONARY ANGIOPLASTY: Primary | ICD-10-CM

## 2023-02-21 PROCEDURE — 99214 OFFICE O/P EST MOD 30 MIN: CPT

## 2023-02-21 NOTE — PROGRESS NOTES
[facility]  [facility] CARDIOLOGY  [address] [place] [address]  Dept: [phone]  Dept Fax: [phone]    Alen Culp  1945    Follow Up Office Visit Note    History of Present Illness:  Alen Culp is a 77 y.o. male who presents to the clinic for Follow-up HTN, CAD, HLP.  He is doing well today, denies all complaints, no CP, SOB.  Known CAD on DAPT, denies bleeding and tolerating well without chest pain.  BP is good today 114/65 on recheck, last visit was stated to be higher.  Physical exam today seems normal.  At this time we will continue current management and follow in 6 months or sooner as indicated.    The following portions of the patient's history were reviewed and updated as appropriate: allergies, current medications, past family history, past medical history, past social history, past surgical history, and problem list.    Medications:  aspirin  atorvastatin  clopidogrel  GNP B-COMPLEX PLUS VITAMIN C PO  levothyroxine  losartan  metFORMIN  metoprolol tartrate    Subjective  No Known Allergies     Past Medical History:   Diagnosis Date   • CAD (coronary artery disease)    • Coronary arteriosclerosis in native artery    • Diabetes (HCC)    • DJD (degenerative joint disease)    • ED (erectile dysfunction)    • Hyperlipidemia        Past Surgical History:   Procedure Laterality Date   • APPENDECTOMY         Family History   Problem Relation Age of Onset   • Coronary artery disease Father    • Diabetes Father    • Hypertension Father    • Coronary artery disease Other    • Lung cancer Other         Social History     Socioeconomic History   • Marital status:    Tobacco Use   • Smoking status: Former     Types: Cigarettes   • Smokeless tobacco: Never   Vaping Use   • Vaping Use: Never used   Substance and Sexual Activity   • Alcohol use: Yes   • Drug use: Never   • Sexual activity: Defer       Review of Systems   All other systems reviewed and are  "negative.      Cardiovascular Procedures    ECHO/MUGA:   STRESS TESTS:   CARDIAC CATH:   DEVICES:   HOLTER:   CT/MRI:   VASCULAR:   CARDIOTHORACIC:     Objective  Vitals:    02/21/23 0902   BP: 102/58   BP Location: Right arm   Patient Position: Sitting   Cuff Size: Adult   Pulse: 77   Resp: 18   Temp: 97.8 °F (36.6 °C)   TempSrc: Infrared   SpO2: 98%   Weight: 70.8 kg (156 lb)   Height: 172.7 cm (68\")   PainSc: 0-No pain     Body mass index is 23.72 kg/m².     Physical Exam  Vitals reviewed.   Constitutional:       Appearance: Healthy appearance. Not in distress.   Neck:      Vascular: No JVR. JVD normal.   Pulmonary:      Effort: Pulmonary effort is normal.      Breath sounds: Normal breath sounds. No wheezing. No rhonchi. No rales.   Chest:      Chest wall: Not tender to palpatation.   Cardiovascular:      PMI at left midclavicular line. Normal rate. Regular rhythm. Normal S1. Normal S2.      Murmurs: There is no murmur.      No gallop. No click. No rub.   Pulses:     Intact distal pulses.   Edema:     Peripheral edema absent.   Abdominal:      General: Bowel sounds are normal.      Palpations: Abdomen is soft.      Tenderness: There is no abdominal tenderness.   Musculoskeletal: Normal range of motion.         General: No tenderness. Skin:     General: Skin is warm and dry.   Neurological:      General: No focal deficit present.      Mental Status: Alert and oriented to person, place and time.          Diagnostic Data  Procedures    Advance Care Planning   ACP discussion was declined by the patient. Patient has an advance directive (not in EMR), copy requested.       Assessment and Plan  Diagnoses and all orders for this visit:    1. CAD S/P percutaneous coronary angioplasty (Primary)  Doing well, on DAPT, denies CP, SOB, bleeding. Continue therapy.   -     CBC & Differential  -     Comprehensive Metabolic Panel    2. Carotid stenosis, asymptomatic, right  Mild disease bilaterally by duplex. Asymptomatic, on " ASA    3. Essential hypertension  Stable on Losartan 25 mg and Toprol bid, BP today 114/65 on recheck. Continue current plan.   -     Comprehensive Metabolic Panel    4. Hyperlipidemia LDL goal <70  On Lipitor 20 mg qd, LDL 44, trig 91 Feb 2022. Will get Lipid, CK today   -     CK  -     Lipid Panel    5. Type 2 diabetes mellitus without complication, without long-term current use of insulin (HCC)  -     Hemoglobin A1c    6. Hypothyroidism (acquired)  Needs to follow with PCP regarding his medication refills  -     TSH Rfx On Abnormal To Free T4         I spent 28 minutes caring for Alen on this date of service. This time includes time spent by me in the following activities: preparing for the visit, performing a medically appropriate examination and/or evaluation, counseling and educating the patient/family/caregiver, ordering medications, tests, or procedures,  documenting information in the medical record and care coordination  Return for [place].    Yarely Ascencio, APRN  02/21/2023    Part of this note may be an electronic transcription/translation of spoken language to printed text using the Dragon Dictation System.

## 2023-02-22 ENCOUNTER — TELEPHONE (OUTPATIENT)
Dept: CARDIOLOGY | Facility: CLINIC | Age: 78
End: 2023-02-22
Payer: MEDICARE

## 2023-02-22 LAB
ALBUMIN SERPL-MCNC: 4.3 G/DL (ref 3.7–4.7)
ALBUMIN/GLOB SERPL: 1.9 {RATIO} (ref 1.2–2.2)
ALP SERPL-CCNC: 76 IU/L (ref 44–121)
ALT SERPL-CCNC: 16 IU/L (ref 0–44)
AST SERPL-CCNC: 16 IU/L (ref 0–40)
BASOPHILS # BLD AUTO: 0 X10E3/UL (ref 0–0.2)
BASOPHILS NFR BLD AUTO: 1 %
BILIRUB SERPL-MCNC: 0.4 MG/DL (ref 0–1.2)
BUN SERPL-MCNC: 18 MG/DL (ref 8–27)
BUN/CREAT SERPL: 17 (ref 10–24)
CALCIUM SERPL-MCNC: 9.4 MG/DL (ref 8.6–10.2)
CHLORIDE SERPL-SCNC: 105 MMOL/L (ref 96–106)
CHOLEST SERPL-MCNC: 107 MG/DL (ref 100–199)
CK SERPL-CCNC: 96 U/L (ref 41–331)
CO2 SERPL-SCNC: 22 MMOL/L (ref 20–29)
CREAT SERPL-MCNC: 1.09 MG/DL (ref 0.76–1.27)
EGFRCR SERPLBLD CKD-EPI 2021: 69 ML/MIN/1.73
EOSINOPHIL # BLD AUTO: 0 X10E3/UL (ref 0–0.4)
EOSINOPHIL NFR BLD AUTO: 1 %
ERYTHROCYTE [DISTWIDTH] IN BLOOD BY AUTOMATED COUNT: 11.9 % (ref 11.6–15.4)
GLOBULIN SER CALC-MCNC: 2.3 G/DL (ref 1.5–4.5)
GLUCOSE SERPL-MCNC: 113 MG/DL (ref 70–99)
HBA1C MFR BLD: 6.8 % (ref 4.8–5.6)
HCT VFR BLD AUTO: 36 % (ref 37.5–51)
HDLC SERPL-MCNC: 57 MG/DL
HGB BLD-MCNC: 12.1 G/DL (ref 13–17.7)
IMM GRANULOCYTES # BLD AUTO: 0 X10E3/UL (ref 0–0.1)
IMM GRANULOCYTES NFR BLD AUTO: 0 %
LDLC SERPL CALC-MCNC: 34 MG/DL (ref 0–99)
LYMPHOCYTES # BLD AUTO: 0.9 X10E3/UL (ref 0.7–3.1)
LYMPHOCYTES NFR BLD AUTO: 15 %
MCH RBC QN AUTO: 31.8 PG (ref 26.6–33)
MCHC RBC AUTO-ENTMCNC: 33.6 G/DL (ref 31.5–35.7)
MCV RBC AUTO: 95 FL (ref 79–97)
MONOCYTES # BLD AUTO: 0.5 X10E3/UL (ref 0.1–0.9)
MONOCYTES NFR BLD AUTO: 8 %
NEUTROPHILS # BLD AUTO: 4.6 X10E3/UL (ref 1.4–7)
NEUTROPHILS NFR BLD AUTO: 75 %
PLATELET # BLD AUTO: 182 X10E3/UL (ref 150–450)
POTASSIUM SERPL-SCNC: 4.5 MMOL/L (ref 3.5–5.2)
PROT SERPL-MCNC: 6.6 G/DL (ref 6–8.5)
RBC # BLD AUTO: 3.8 X10E6/UL (ref 4.14–5.8)
SODIUM SERPL-SCNC: 141 MMOL/L (ref 134–144)
T4 FREE SERPL-MCNC: 1.48 NG/DL (ref 0.82–1.77)
TRIGL SERPL-MCNC: 76 MG/DL (ref 0–149)
TSH SERPL DL<=0.005 MIU/L-ACNC: 0.2 UIU/ML (ref 0.45–4.5)
VLDLC SERPL CALC-MCNC: 16 MG/DL (ref 5–40)
WBC # BLD AUTO: 6.1 X10E3/UL (ref 3.4–10.8)

## 2023-02-22 NOTE — TELEPHONE ENCOUNTER
----- Message from MICHELLE Celaya sent at 2/22/2023  9:44 AM EST -----  His labs remain stable, still showing some mild anemia which looks to have been present for at least a year. Cholesterol looks good. His thyroid function is still a little bit low. I will send to Dr. Malhotra, he will need to call to schedule a follow up with them for annual exam. They will need to dose or make any changes to his synthroid if it is necessary. His A1C for diabetes is looking alittle better 6.8.     Pt contacted he was a copy of his results

## 2023-03-15 ENCOUNTER — OFFICE VISIT (OUTPATIENT)
Dept: FAMILY MEDICINE CLINIC | Facility: CLINIC | Age: 78
End: 2023-03-15
Payer: MEDICARE

## 2023-03-15 VITALS
DIASTOLIC BLOOD PRESSURE: 60 MMHG | BODY MASS INDEX: 24.45 KG/M2 | WEIGHT: 155.8 LBS | OXYGEN SATURATION: 99 % | HEART RATE: 85 BPM | HEIGHT: 67 IN | SYSTOLIC BLOOD PRESSURE: 110 MMHG

## 2023-03-15 DIAGNOSIS — E53.8 B12 DEFICIENCY: ICD-10-CM

## 2023-03-15 DIAGNOSIS — E78.2 MIXED HYPERLIPIDEMIA: ICD-10-CM

## 2023-03-15 DIAGNOSIS — I10 PRIMARY HYPERTENSION: ICD-10-CM

## 2023-03-15 DIAGNOSIS — I25.10 CORONARY ARTERY DISEASE INVOLVING NATIVE CORONARY ARTERY OF NATIVE HEART WITHOUT ANGINA PECTORIS: ICD-10-CM

## 2023-03-15 DIAGNOSIS — E11.9 TYPE 2 DIABETES MELLITUS WITHOUT COMPLICATION, WITHOUT LONG-TERM CURRENT USE OF INSULIN: ICD-10-CM

## 2023-03-15 DIAGNOSIS — E03.9 ACQUIRED HYPOTHYROIDISM: Primary | ICD-10-CM

## 2023-03-15 PROCEDURE — 99213 OFFICE O/P EST LOW 20 MIN: CPT | Performed by: FAMILY MEDICINE

## 2023-03-15 PROCEDURE — 3074F SYST BP LT 130 MM HG: CPT | Performed by: FAMILY MEDICINE

## 2023-03-15 PROCEDURE — 3078F DIAST BP <80 MM HG: CPT | Performed by: FAMILY MEDICINE

## 2023-03-15 RX ORDER — LEVOTHYROXINE SODIUM 88 UG/1
88 TABLET ORAL DAILY
Qty: 90 TABLET | Refills: 1 | Status: SHIPPED | OUTPATIENT
Start: 2023-03-15

## 2023-03-15 NOTE — PROGRESS NOTES
"Chief Complaint  Thyroid Problem and Diabetes    Subjective          Alen Culp presents to [facility]  History of Present Illness  Patient comes in today saying he needs to reestablish care he has been seeing his cardiologist and being having some blood work done he reports that his problems been doing pretty good he needs some meds renewed as well.  He states that he had been seeing endocrinology but he retired he says he has not been able to get his medicines because of this he needs to get his thyroid medicine and also to follow-up with his blood glucose as well.  Thyroid Problem  Patient reports no constipation.   Diabetes  Pertinent negatives for diabetes include no polyuria.       Objective   Vital Signs:   /60   Pulse 85   Ht 170.2 cm (67\")   Wt 70.7 kg (155 lb 12.8 oz)   SpO2 99%   BMI 24.40 kg/m²     Body mass index is 24.4 kg/m².    Review of Systems   Constitutional: Negative.    HENT: Negative for congestion, dental problem, ear discharge, ear pain and sore throat.    Respiratory: Negative for apnea, chest tightness and shortness of breath.    Gastrointestinal: Negative for constipation and nausea.   Endocrine: Negative for polyuria.   Genitourinary: Negative for difficulty urinating.   Musculoskeletal: Negative for arthralgias and gait problem.   Skin: Negative for rash.   Hematological: Negative for adenopathy.       Past History:  Medical History: has a past medical history of CAD (coronary artery disease), Coronary arteriosclerosis in native artery, Diabetes (HCC), DJD (degenerative joint disease), ED (erectile dysfunction), and Hyperlipidemia.   Surgical History: has a past surgical history that includes Appendectomy.         Current Outpatient Medications:   •  aspirin 81 MG EC tablet, Take 1 tablet by mouth Daily., Disp: , Rfl:   •  atorvastatin (LIPITOR) 20 MG tablet, TAKE 1 TABLET BY MOUTH  DAILY, Disp: 90 tablet, Rfl: 3  •  B Complex-C-Calcium (GNP " B-COMPLEX PLUS VITAMIN C PO), Take  by mouth., Disp: , Rfl:   •  clopidogrel (PLAVIX) 75 MG tablet, TAKE 1 TABLET BY MOUTH  DAILY, Disp: 90 tablet, Rfl: 3  •  levothyroxine (SYNTHROID, LEVOTHROID) 88 MCG tablet, Take 1 tablet by mouth Daily., Disp: 90 tablet, Rfl: 1  •  losartan (COZAAR) 25 MG tablet, TAKE 1 TABLET BY MOUTH  DAILY, Disp: 90 tablet, Rfl: 3  •  metFORMIN (GLUCOPHAGE) 500 MG tablet, TAKE 2 TABLETS BY MOUTH  TWICE DAILY WITH MEALS, Disp: 360 tablet, Rfl: 3  •  metoprolol tartrate (LOPRESSOR) 25 MG tablet, Take one HALF tablet by mouth twice daily, Disp: 90 tablet, Rfl: 3    Allergies: Patient has no known allergies.    Physical Exam  Vitals reviewed.   Constitutional:       Appearance: Normal appearance.   HENT:      Head: Normocephalic.      Right Ear: Tympanic membrane, ear canal and external ear normal.      Left Ear: Tympanic membrane, ear canal and external ear normal.      Nose: Nose normal.      Mouth/Throat:      Pharynx: Oropharynx is clear.   Eyes:      Pupils: Pupils are equal, round, and reactive to light.   Cardiovascular:      Rate and Rhythm: Normal rate and regular rhythm.      Pulses: Normal pulses.   Pulmonary:      Effort: Pulmonary effort is normal.      Breath sounds: Normal breath sounds.   Abdominal:      General: Abdomen is flat. Bowel sounds are normal.      Palpations: Abdomen is soft.   Musculoskeletal:         General: Normal range of motion.   Skin:     General: Skin is warm and dry.   Neurological:      General: No focal deficit present.      Mental Status: He is alert and oriented to person, place, and time.          Result Review :                   Assessment and Plan    Diagnoses and all orders for this visit:    1. Acquired hypothyroidism (Primary)  Comments:  Reviewed TSH continue medication    2. Mixed hyperlipidemia  Comments:  Continues on lipid therapy  Orders:  -     levothyroxine (SYNTHROID, LEVOTHROID) 88 MCG tablet; Take 1 tablet by mouth Daily.  Dispense: 90  tablet; Refill: 1    3. Type 2 diabetes mellitus without complication, without long-term current use of insulin (HCC)  Comments:  Hemoglobin A1c was good continue metformin    4. B12 deficiency  Comments:  Replacing needs level checked at some point    5. Primary hypertension  Comments:  Stable    6. Coronary artery disease involving native coronary artery of native heart without angina pectoris  Comments:  Stable seeing cardiology              Follow Up   No follow-ups on file.  Patient was given instructions and counseling regarding his condition or for health maintenance advice. Please see specific information pulled into the AVS if appropriate.     Benton Malhotra MD

## 2023-08-02 DIAGNOSIS — E78.2 MIXED HYPERLIPIDEMIA: ICD-10-CM

## 2023-08-03 RX ORDER — LEVOTHYROXINE SODIUM 88 UG/1
88 TABLET ORAL DAILY
Qty: 90 TABLET | Refills: 0 | Status: SHIPPED | OUTPATIENT
Start: 2023-08-03

## 2023-08-21 ENCOUNTER — OFFICE VISIT (OUTPATIENT)
Dept: CARDIOLOGY | Facility: CLINIC | Age: 78
End: 2023-08-21
Payer: MEDICARE

## 2023-08-21 ENCOUNTER — TELEPHONE (OUTPATIENT)
Dept: CARDIOLOGY | Facility: CLINIC | Age: 78
End: 2023-08-21

## 2023-08-21 VITALS
OXYGEN SATURATION: 99 % | RESPIRATION RATE: 18 BRPM | HEART RATE: 72 BPM | DIASTOLIC BLOOD PRESSURE: 66 MMHG | HEIGHT: 67 IN | SYSTOLIC BLOOD PRESSURE: 112 MMHG | WEIGHT: 158 LBS | TEMPERATURE: 97 F | BODY MASS INDEX: 24.8 KG/M2

## 2023-08-21 DIAGNOSIS — I25.10 CAD S/P PERCUTANEOUS CORONARY ANGIOPLASTY: Primary | ICD-10-CM

## 2023-08-21 DIAGNOSIS — E11.9 TYPE 2 DIABETES MELLITUS WITHOUT COMPLICATION, WITHOUT LONG-TERM CURRENT USE OF INSULIN: ICD-10-CM

## 2023-08-21 DIAGNOSIS — E78.5 HYPERLIPIDEMIA LDL GOAL <70: ICD-10-CM

## 2023-08-21 DIAGNOSIS — I65.21 CAROTID STENOSIS, ASYMPTOMATIC, RIGHT: ICD-10-CM

## 2023-08-21 DIAGNOSIS — Z98.61 CAD S/P PERCUTANEOUS CORONARY ANGIOPLASTY: Primary | ICD-10-CM

## 2023-08-21 DIAGNOSIS — I10 ESSENTIAL HYPERTENSION: ICD-10-CM

## 2023-08-21 PROCEDURE — 93000 ELECTROCARDIOGRAM COMPLETE: CPT | Performed by: INTERNAL MEDICINE

## 2023-08-21 PROCEDURE — 3078F DIAST BP <80 MM HG: CPT | Performed by: INTERNAL MEDICINE

## 2023-08-21 PROCEDURE — 1160F RVW MEDS BY RX/DR IN RCRD: CPT | Performed by: INTERNAL MEDICINE

## 2023-08-21 PROCEDURE — 99214 OFFICE O/P EST MOD 30 MIN: CPT | Performed by: INTERNAL MEDICINE

## 2023-08-21 PROCEDURE — 1159F MED LIST DOCD IN RCRD: CPT | Performed by: INTERNAL MEDICINE

## 2023-08-21 PROCEDURE — 3074F SYST BP LT 130 MM HG: CPT | Performed by: INTERNAL MEDICINE

## 2023-08-21 RX ORDER — LOSARTAN POTASSIUM 25 MG/1
25 TABLET ORAL DAILY
Qty: 90 TABLET | Refills: 3 | Status: SHIPPED | OUTPATIENT
Start: 2023-08-21 | End: 2023-08-21 | Stop reason: SDUPTHER

## 2023-08-21 RX ORDER — LOSARTAN POTASSIUM 50 MG/1
50 TABLET ORAL DAILY
Qty: 90 TABLET | Refills: 3 | Status: SHIPPED | OUTPATIENT
Start: 2023-08-21

## 2023-08-21 NOTE — TELEPHONE ENCOUNTER
Per patient phone call, Dr Cooper told him to call in his blood presure results before and after his walk. Before his walk at [facility] 142/67, then 2.5 miles around [facility] his blood pressure was 108/59. His phone# [phone]

## 2023-08-21 NOTE — PROGRESS NOTES
[facility]  [facility]  [address] [place] [address]  Dept: [phone]  Dept Fax: [phone]    Alen Culp  1945    Follow Up Office Visit Note    History of Present Illness:  Alen Culp is a 78 y.o. male who presents to the clinic for Follow-up. CAD- He denies any chest pain, has prior stents to LAD and RCA in 2004 since then no complaints  he walks daily 5 miles. EKG sinus with old inferior wall Mi,, toay Bp is 150.80 at home seems good and also at [facility], he takes his meds, will increase Losartan to 50 mg    The following portions of the patient's history were reviewed and updated as appropriate: allergies, current medications, past family history, past medical history, past social history, past surgical history, and problem list.    Medications:  aspirin  atorvastatin  clopidogrel  GNP B-COMPLEX PLUS VITAMIN C PO  levothyroxine  losartan  metFORMIN  metoprolol tartrate    Subjective  No Known Allergies     Past Medical History:   Diagnosis Date    CAD (coronary artery disease)     Coronary arteriosclerosis in native artery     Diabetes     DJD (degenerative joint disease)     ED (erectile dysfunction)     Hyperlipidemia        Past Surgical History:   Procedure Laterality Date    APPENDECTOMY         Family History   Problem Relation Age of Onset    Coronary artery disease Father     Diabetes Father     Hypertension Father     Coronary artery disease Other     Lung cancer Other         Social History     Socioeconomic History    Marital status:    Tobacco Use    Smoking status: Former     Types: Cigarettes    Smokeless tobacco: Never   Vaping Use    Vaping Use: Never used   Substance and Sexual Activity    Alcohol use: Yes    Drug use: Never    Sexual activity: Defer       Review of Systems   Constitutional: Negative.    HENT: Negative.     Respiratory: Negative.     Cardiovascular: Negative.    Endocrine: Negative.    Genitourinary:  "Negative.    Musculoskeletal: Negative.    Skin: Negative.    Allergic/Immunologic: Negative.    Neurological: Negative.    Hematological: Negative.    Psychiatric/Behavioral: Negative.       Cardiovascular Procedures    ECHO/MUGA:  STRESS TESTS:   CARDIAC CATH:   DEVICES:   HOLTER:   CT/MRI:   VASCULAR:   CARDIOTHORACIC:     Objective  Vitals:    08/21/23 0846   BP: 112/66   BP Location: Right arm   Patient Position: Lying   Cuff Size: Adult   Pulse: 72   Resp: 18   Temp: 97 øF (36.1 øC)   TempSrc: Infrared   SpO2: 99%   Weight: 71.7 kg (158 lb)   Height: 170.2 cm (67\")   PainSc: 0-No pain     Body mass index is 24.75 kg/mý.     Physical Exam  Vitals reviewed.   Constitutional:       Appearance: Healthy appearance. Not in distress.   Neck:      Vascular: No JVR. JVD normal.   Pulmonary:      Effort: Pulmonary effort is normal.      Breath sounds: Normal breath sounds. No wheezing. No rhonchi. No rales.   Chest:      Chest wall: Not tender to palpatation.   Cardiovascular:      PMI at left midclavicular line. Normal rate. Regular rhythm. Normal S1. Normal S2.       Murmurs: There is no murmur.      No gallop.  No click. No rub.   Pulses:     Intact distal pulses.   Edema:     Peripheral edema absent.   Abdominal:      General: Bowel sounds are normal.      Palpations: Abdomen is soft.      Tenderness: There is no abdominal tenderness.   Musculoskeletal: Normal range of motion.         General: No tenderness. Skin:     General: Skin is warm and dry.   Neurological:      General: No focal deficit present.      Mental Status: Alert and oriented to person, place and time.        Diagnostic Data    ECG 12 Lead    Date/Time: 8/21/2023 9:10 AM  Performed by: Sundeep Cooper MD  Authorized by: Sundeep Cooper MD   Comparison: compared with previous ECG from 8/17/2022  Similar to previous ECG  Rhythm: sinus rhythm  Rate: normal  BPM: 76  Q waves: III and aVF    QRS axis: normal    Clinical impression: abnormal " EKG and myocardial infarction        Assessment and Plan  Diagnoses and all orders for this visit:    CAD S/P percutaneous coronary angioplasty- No chest pain, prior stents to LAD and RCA on ASA and Plavix, no bleeding  -     TSH  -     Basic Metabolic Panel  -     Hemoglobin A1c  -     T4, Free; Future    Carotid stenosis, asymptomatic, right= Mild disease by doppler    Essential hypertension-BP here is high 160.80,on Losartan 25 mg and also Metoprolol 25 bid, will increase Losartan to 50 mg,     Hyperlipidemia LDL goal <70- On Lipitor 20 mg, lipids are good    Type 2 diabetes mellitus without complication, without long-term current use of insulin  -     TSH  -     Basic Metabolic Panel  -     Hemoglobin A1c  -     T4, Free; Future    Other orders  -     losartan (COZAAR) 25 MG tablet; Take 1 tablet by mouth Daily.         No follow-ups on file.    Sundeep Cooper MD  08/21/2023

## 2023-08-21 NOTE — TELEPHONE ENCOUNTER
Spoke with  Robbi and advised him Dr. Cooper reviewed the BP's he called in and is increasing the Losartan from 25mg to 50mg daily and sent in a new prescription.  Pt verbalized understanding.

## 2023-08-21 NOTE — TELEPHONE ENCOUNTER
Dr. Cooper, MR. Sobon called in blood presure results before and after his walk. Before his walk at [facility] 142/67, then 2.5 miles around [facility] his blood pressure was 108/59.

## 2023-08-22 ENCOUNTER — TELEPHONE (OUTPATIENT)
Dept: CARDIOLOGY | Facility: CLINIC | Age: 78
End: 2023-08-22
Payer: MEDICARE

## 2023-08-22 LAB
BUN SERPL-MCNC: 17 MG/DL (ref 8–27)
BUN/CREAT SERPL: 18 (ref 10–24)
CALCIUM SERPL-MCNC: 9.1 MG/DL (ref 8.6–10.2)
CHLORIDE SERPL-SCNC: 107 MMOL/L (ref 96–106)
CO2 SERPL-SCNC: 20 MMOL/L (ref 20–29)
CREAT SERPL-MCNC: 0.94 MG/DL (ref 0.76–1.27)
EGFRCR SERPLBLD CKD-EPI 2021: 82 ML/MIN/1.73
GLUCOSE SERPL-MCNC: 123 MG/DL (ref 70–99)
HBA1C MFR BLD: 7 % (ref 4.8–5.6)
POTASSIUM SERPL-SCNC: 4.8 MMOL/L (ref 3.5–5.2)
SODIUM SERPL-SCNC: 142 MMOL/L (ref 134–144)
T4 FREE SERPL-MCNC: 1.53 NG/DL (ref 0.82–1.77)
TSH SERPL DL<=0.005 MIU/L-ACNC: 0.04 UIU/ML (ref 0.45–4.5)

## 2023-08-22 NOTE — TELEPHONE ENCOUNTER
----- Message from Sundeep Cooper MD sent at 8/22/2023  8:31 AM EDT -----  His TSH is low, has been low for months, he has appointment to see Dr painting soon and he can discuss this matter

## 2023-08-22 NOTE — TELEPHONE ENCOUNTER
Spoke with Mr. Powerson and went over recent labs.  Your TSH level is low please discuss with Dr. Malhotra at your next visit.

## 2023-09-15 ENCOUNTER — OFFICE VISIT (OUTPATIENT)
Dept: FAMILY MEDICINE CLINIC | Facility: CLINIC | Age: 78
End: 2023-09-15
Payer: MEDICARE

## 2023-09-15 VITALS
RESPIRATION RATE: 16 BRPM | OXYGEN SATURATION: 99 % | HEIGHT: 67 IN | SYSTOLIC BLOOD PRESSURE: 140 MMHG | WEIGHT: 156 LBS | DIASTOLIC BLOOD PRESSURE: 78 MMHG | BODY MASS INDEX: 24.48 KG/M2 | HEART RATE: 84 BPM

## 2023-09-15 DIAGNOSIS — E11.9 TYPE 2 DIABETES MELLITUS WITHOUT COMPLICATION, WITHOUT LONG-TERM CURRENT USE OF INSULIN: Primary | ICD-10-CM

## 2023-09-15 DIAGNOSIS — E53.8 B12 DEFICIENCY: ICD-10-CM

## 2023-09-15 DIAGNOSIS — E03.9 ACQUIRED HYPOTHYROIDISM: ICD-10-CM

## 2023-09-15 DIAGNOSIS — E78.2 MIXED HYPERLIPIDEMIA: ICD-10-CM

## 2023-09-15 DIAGNOSIS — I10 PRIMARY HYPERTENSION: ICD-10-CM

## 2023-09-15 RX ORDER — LEVOTHYROXINE SODIUM 0.07 MG/1
75 TABLET ORAL DAILY
Qty: 90 TABLET | Refills: 2 | Status: SHIPPED | OUTPATIENT
Start: 2023-09-15

## 2023-09-15 NOTE — PROGRESS NOTES
"Chief Complaint  Hypothyroidism (6 Month follow up) and Hyperlipidemia (6 Month follow up)    Subjective          Alen Culp presents to [facility]  History of Present Illness  Comes in with no complaints of the difficulties he says been exercising little bit he does not think he is lost any weight but he says he has been doing pretty good seeing cardiologist they have adjusted his medications she is also says his sugars been running well at this time to  Hypothyroidism  Pertinent negatives include no arthralgias, congestion, nausea, rash or sore throat.   Hyperlipidemia  Exacerbating diseases include hypothyroidism. Pertinent negatives include no shortness of breath.     Objective   Vital Signs:   /78 (BP Location: Left arm, Patient Position: Sitting, Cuff Size: Adult)   Pulse 84   Resp 16   Ht 170.2 cm (67\")   Wt 70.8 kg (156 lb)   SpO2 99%   BMI 24.43 kg/m²     Body mass index is 24.43 kg/m².    Review of Systems   Constitutional: Negative.    HENT:  Negative for congestion, dental problem, ear discharge, ear pain and sore throat.    Respiratory:  Negative for apnea, chest tightness and shortness of breath.    Gastrointestinal:  Negative for constipation and nausea.   Endocrine: Negative for polyuria.   Genitourinary:  Negative for difficulty urinating.   Musculoskeletal:  Negative for arthralgias and gait problem.   Skin:  Negative for rash.   Hematological:  Negative for adenopathy.     Past History:  Medical History: has a past medical history of CAD (coronary artery disease), Coronary arteriosclerosis in native artery, Diabetes, DJD (degenerative joint disease), ED (erectile dysfunction), and Hyperlipidemia.   Surgical History: has a past surgical history that includes Appendectomy.         Current Outpatient Medications:     aspirin 81 MG EC tablet, Take 1 tablet by mouth Daily., Disp: , Rfl:     atorvastatin (LIPITOR) 20 MG tablet, TAKE 1 TABLET BY MOUTH  DAILY, " Disp: 90 tablet, Rfl: 3    B Complex-C-Calcium (GNP B-COMPLEX PLUS VITAMIN C PO), Take  by mouth., Disp: , Rfl:     clopidogrel (PLAVIX) 75 MG tablet, TAKE 1 TABLET BY MOUTH  DAILY, Disp: 90 tablet, Rfl: 3    losartan (COZAAR) 50 MG tablet, Take 1 tablet by mouth Daily., Disp: 90 tablet, Rfl: 3    metFORMIN (GLUCOPHAGE) 500 MG tablet, Take 2 tablets by mouth 2 (Two) Times a Day With Meals., Disp: 360 tablet, Rfl: 2    metoprolol tartrate (LOPRESSOR) 25 MG tablet, TAKE ONE-HALF TABLET BY  MOUTH TWICE DAILY, Disp: 90 tablet, Rfl: 1    levothyroxine (Synthroid) 75 MCG tablet, Take 1 tablet by mouth Daily., Disp: 90 tablet, Rfl: 2    Allergies: Patient has no known allergies.    Physical Exam  Constitutional:       Appearance: Normal appearance.   HENT:      Head: Normocephalic.      Right Ear: Tympanic membrane, ear canal and external ear normal.      Left Ear: Tympanic membrane, ear canal and external ear normal.      Nose: Nose normal.      Mouth/Throat:      Pharynx: Oropharynx is clear.   Eyes:      Pupils: Pupils are equal, round, and reactive to light.   Cardiovascular:      Rate and Rhythm: Normal rate and regular rhythm.      Pulses: Normal pulses.   Pulmonary:      Effort: Pulmonary effort is normal.      Breath sounds: Normal breath sounds.   Abdominal:      General: Abdomen is flat. Bowel sounds are normal.      Palpations: Abdomen is soft.   Musculoskeletal:         General: Normal range of motion.   Skin:     General: Skin is warm and dry.   Neurological:      General: No focal deficit present.      Mental Status: He is alert and oriented to person, place, and time.        Result Review :                   Assessment and Plan    Diagnoses and all orders for this visit:    1. Type 2 diabetes mellitus without complication, without long-term current use of insulin (Primary)  Comments:  Hemoglobin A1c 7 continue medications and monitor  Orders:  -     metFORMIN (GLUCOPHAGE) 500 MG tablet; Take 2 tablets by  mouth 2 (Two) Times a Day With Meals.  Dispense: 360 tablet; Refill: 2    2. Mixed hyperlipidemia  Comments:  Continue medications blood work good    3. Acquired hypothyroidism  Comments:  TSH a little low we will reduce Synthroid from 88 mcg to 75 and monitor  Orders:  -     levothyroxine (Synthroid) 75 MCG tablet; Take 1 tablet by mouth Daily.  Dispense: 90 tablet; Refill: 2    4. B12 deficiency  Comments:  Replacing    5. Primary hypertension  Comments:  Stable says that cardiology recently increased on this medication since been doing better              Follow Up   Return in about 3 months (around 12/15/2023).  Patient was given instructions and counseling regarding his condition or for health maintenance advice. Please see specific information pulled into the AVS if appropriate.     Benton Malhotra MD

## 2023-12-07 DIAGNOSIS — I10 ESSENTIAL HYPERTENSION, BENIGN: ICD-10-CM

## 2023-12-07 DIAGNOSIS — I10 ESSENTIAL HYPERTENSION: ICD-10-CM

## 2023-12-07 RX ORDER — ATORVASTATIN CALCIUM 20 MG/1
20 TABLET, FILM COATED ORAL DAILY
Qty: 90 TABLET | Refills: 3 | Status: SHIPPED | OUTPATIENT
Start: 2023-12-07

## 2023-12-07 RX ORDER — CLOPIDOGREL BISULFATE 75 MG/1
75 TABLET ORAL DAILY
Qty: 90 TABLET | Refills: 3 | Status: SHIPPED | OUTPATIENT
Start: 2023-12-07

## 2023-12-18 ENCOUNTER — OFFICE VISIT (OUTPATIENT)
Dept: FAMILY MEDICINE CLINIC | Facility: CLINIC | Age: 78
End: 2023-12-18
Payer: MEDICARE

## 2023-12-18 VITALS
SYSTOLIC BLOOD PRESSURE: 128 MMHG | TEMPERATURE: 96.6 F | RESPIRATION RATE: 16 BRPM | HEART RATE: 69 BPM | HEIGHT: 67 IN | DIASTOLIC BLOOD PRESSURE: 64 MMHG | OXYGEN SATURATION: 98 % | WEIGHT: 157.1 LBS | BODY MASS INDEX: 24.66 KG/M2

## 2023-12-18 DIAGNOSIS — E78.5 HYPERLIPIDEMIA LDL GOAL <70: ICD-10-CM

## 2023-12-18 DIAGNOSIS — Z00.00 ENCOUNTER FOR ANNUAL WELLNESS VISIT (AWV) IN MEDICARE PATIENT: ICD-10-CM

## 2023-12-18 DIAGNOSIS — Z23 ENCOUNTER FOR IMMUNIZATION: ICD-10-CM

## 2023-12-18 DIAGNOSIS — E11.9 TYPE 2 DIABETES MELLITUS WITHOUT COMPLICATION, WITHOUT LONG-TERM CURRENT USE OF INSULIN: ICD-10-CM

## 2023-12-18 DIAGNOSIS — E03.9 ACQUIRED HYPOTHYROIDISM: ICD-10-CM

## 2023-12-18 DIAGNOSIS — I10 ESSENTIAL HYPERTENSION: ICD-10-CM

## 2023-12-18 RX ORDER — LEVOTHYROXINE SODIUM 0.07 MG/1
75 TABLET ORAL DAILY
Qty: 90 TABLET | Refills: 2 | Status: CANCELLED | OUTPATIENT
Start: 2023-12-18

## 2023-12-18 NOTE — PROGRESS NOTES
The ABCs of the Annual Wellness Visit  Subsequent Medicare Wellness Visit    Subjective    Alen Culp is a 78 y.o. male who presents for a Subsequent Medicare Wellness Visit.    The following portions of the patient's history were reviewed and   updated as appropriate: allergies, current medications, past family history, past medical history, past social history, past surgical history, and problem list.    Compared to one year ago, the patient feels his physical   health is the same.    Compared to one year ago, the patient feels his mental   health is the same.    Recent Hospitalizations:  He was not admitted to the hospital during the last year.       Current Medical Providers:  Patient Care Team:  Payal Smyth PA-C as PCP - General (Physician Assistant)  Sundeep Cooper MD as Consulting Physician (Cardiology)    Outpatient Medications Prior to Visit   Medication Sig Dispense Refill    aspirin 81 MG EC tablet Take 1 tablet by mouth Daily.      atorvastatin (LIPITOR) 20 MG tablet Take 1 tablet by mouth Daily. 90 tablet 3    B Complex-C-Calcium (GNP B-COMPLEX PLUS VITAMIN C PO) Take  by mouth.      clopidogrel (PLAVIX) 75 MG tablet TAKE 1 TABLET BY MOUTH DAILY 90 tablet 3    levothyroxine (Synthroid) 75 MCG tablet Take 1 tablet by mouth Daily. 90 tablet 2    losartan (COZAAR) 50 MG tablet Take 1 tablet by mouth Daily. 90 tablet 3    metFORMIN (GLUCOPHAGE) 500 MG tablet Take 2 tablets by mouth 2 (Two) Times a Day With Meals. 360 tablet 2    metoprolol tartrate (LOPRESSOR) 25 MG tablet TAKE ONE-HALF TABLET BY  MOUTH TWICE DAILY 90 tablet 1     No facility-administered medications prior to visit.       No opioid medication identified on active medication list. I have reviewed chart for other potential  high risk medication/s and harmful drug interactions in the elderly.        Aspirin is on active medication list. Aspirin use is indicated based on review of current medical condition/s. Pros and cons of  "this therapy have been discussed today. Benefits of this medication outweigh potential harm.  Patient has been encouraged to continue taking this medication.  .      Patient Active Problem List   Diagnosis    CAD S/P percutaneous coronary angioplasty    Essential hypertension    Hyperlipidemia LDL goal <70    Type 2 diabetes mellitus, without long-term current use of insulin    Carotid stenosis, asymptomatic, right    Encounter for annual wellness visit (AWV) in Medicare patient    Encounter for immunization     Advance Care Planning   Advance Care Planning     Advance Directive is not on file.  ACP discussion was held with the patient during this visit. Patient has an advance directive (not in EMR), copy requested.     Objective    Vitals:    23 0936   BP: 128/64   BP Location: Left arm   Patient Position: Sitting   Pulse: 69   Resp: 16   Temp: 96.6 °F (35.9 °C)   SpO2: 98%   Weight: 71.3 kg (157 lb 1.6 oz)   Height: 170.2 cm (67\")   PainSc: 0-No pain     Estimated body mass index is 24.61 kg/m² as calculated from the following:    Height as of this encounter: 170.2 cm (67\").    Weight as of this encounter: 71.3 kg (157 lb 1.6 oz).    BMI is within normal parameters. No other follow-up for BMI required.      Does the patient have evidence of cognitive impairment? No          HEALTH RISK ASSESSMENT    Smoking Status:  Social History     Tobacco Use   Smoking Status Former    Packs/day: 1.00    Years: 10.00    Additional pack years: 0.00    Total pack years: 10.00    Types: Cigarettes    Start date:     Quit date: 1960    Years since quittin.0    Passive exposure: Past   Smokeless Tobacco Never     Alcohol Consumption:  Social History     Substance and Sexual Activity   Alcohol Use Yes    Alcohol/week: 1.0 standard drink of alcohol    Types: 1 Standard drinks or equivalent per week     Fall Risk Screen:    CALEBADI Fall Risk Assessment has not been completed.    Depression Screenin/18/2023    " 10:00 AM   PHQ-2/PHQ-9 Depression Screening   Little Interest or Pleasure in Doing Things 0-->not at all   Feeling Down, Depressed or Hopeless 0-->not at all   PHQ-9: Brief Depression Severity Measure Score 0       Health Habits and Functional and Cognitive Screenin/18/2023    10:00 AM   Functional & Cognitive Status   Do you have difficulty preparing food and eating? No   Do you have difficulty bathing yourself, getting dressed or grooming yourself? No   Do you have difficulty using the toilet? No   Do you have difficulty moving around from place to place? No   Do you have trouble with steps or getting out of a bed or a chair? No   Current Diet Well Balanced Diet   Dental Exam Up to date   Eye Exam Not up to date   Exercise (times per week) 7 times per week   Current Exercises Include Walking   Do you need help using the phone?  No   Are you deaf or do you have serious difficulty hearing?  No   Do you need help to go to places out of walking distance? No   Do you need help shopping? No   Do you need help preparing meals?  No   Do you need help with housework?  No   Do you need help with laundry? No   Do you need help taking your medications? No   Do you need help managing money? No   Do you ever drive or ride in a car without wearing a seat belt? No   Have you felt unusual stress, anger or loneliness in the last month? No   Who do you live with? Alone   If you need help, do you have trouble finding someone available to you? No   Have you been bothered in the last four weeks by sexual problems? No   Do you have difficulty concentrating, remembering or making decisions? Yes       Age-appropriate Screening Schedule:  Refer to the list below for future screening recommendations based on patient's age, sex and/or medical conditions. Orders for these recommended tests are listed in the plan section. The patient has been provided with a written plan.    Health Maintenance   Topic Date Due    URINE MICROALBUMIN   "Never done    TDAP/TD VACCINES (1 - Tdap) Never done    ZOSTER VACCINE (1 of 2) Never done    DIABETIC EYE EXAM  05/07/2021    LIPID PANEL  02/21/2024    HEMOGLOBIN A1C  02/21/2024    ANNUAL WELLNESS VISIT  12/18/2024    COVID-19 Vaccine  Completed    INFLUENZA VACCINE  Completed    Pneumococcal Vaccine 65+  Completed                  CMS Preventative Services Quick Reference  Risk Factors Identified During Encounter  None Identified  The above risks/problems have been discussed with the patient.  Pertinent information has been shared with the patient in the After Visit Summary.  An After Visit Summary and PPPS were made available to the patient.    Follow Up:   Next Medicare Wellness visit to be scheduled in 1 year.       Additional E&M Note during same encounter follows:  Patient has multiple medical problems which are significant and separately identifiable that require additional work above and beyond the Medicare Wellness Visit.      Chief Complaint  Hypothyroidism (Med review- levothyroxine )    Subjective        HPI  Alen Culp is also being seen today for recheck of his thyroid.  Patient reports having medication adjustment around 6 months ago and he is due for blood work today.    Patient also reports having type 2 diabetes states he is due for blood work.  Patient reports taking metformin daily.       Objective   Vital Signs:  /64 (BP Location: Left arm, Patient Position: Sitting)   Pulse 69   Temp 96.6 °F (35.9 °C)   Resp 16   Ht 170.2 cm (67\")   Wt 71.3 kg (157 lb 1.6 oz)   SpO2 98%   BMI 24.61 kg/m²     Physical Exam  Vitals and nursing note reviewed.   Constitutional:       General: He is not in acute distress.     Appearance: Normal appearance.   HENT:      Head: Normocephalic.      Right Ear: Hearing normal.      Left Ear: Hearing normal.   Eyes:      Pupils: Pupils are equal, round, and reactive to light.   Cardiovascular:      Rate and Rhythm: Normal rate and regular rhythm. "   Pulmonary:      Effort: Pulmonary effort is normal. No respiratory distress.   Skin:     General: Skin is warm and dry.   Neurological:      Mental Status: He is alert.   Psychiatric:         Mood and Affect: Mood normal.                         Assessment and Plan   Diagnoses and all orders for this visit:    1. Acquired hypothyroidism  -     TSH; Future  -     TSH    2. Hyperlipidemia LDL goal <70  Assessment & Plan:  Patient will continue current treatment plan with cardiology.      3. Type 2 diabetes mellitus without complication, without long-term current use of insulin  Assessment & Plan:  Diabetes is unchanged.   Dietary recommendations for ADA diet.  Regular aerobic exercise.  Diabetes will be reassessed in 6 months.    Orders:  -     POCT microalbumin  -     Comprehensive Metabolic Panel; Future  -     Hemoglobin A1c; Future  -     Comprehensive Metabolic Panel  -     Hemoglobin A1c    4. Encounter for annual wellness visit (AWV) in Medicare patient  Assessment & Plan:  Updated annual wellness visit checklist.  Immunizations discussed.  Screening up-to-date.  Recommend yearly dental and eye exams. Also discussed monitoring of blood pressure and lipids. We addressed patient self-assessment of health status, frailty, and physical functioning. We reviewed psychosocial risks, behavioral risks, instrumental activities of daily living, and patient health risk assessment. Patient was given a personalized prevention plan        5. Essential hypertension  Assessment & Plan:  Hypertension is improving with treatment.  Continue current treatment regimen.  Blood pressure will be reassessed at the next regular appointment.  Patient continue follow-up with cardiology    Orders:  -     metoprolol tartrate (LOPRESSOR) 25 MG tablet; TAKE ONE-HALF TABLET BY  MOUTH TWICE DAILY  Dispense: 90 tablet; Refill: 3    6. Encounter for immunization  Assessment & Plan:  Patient provided with pneumonia vaccine today    Orders:  -      Pneumococcal Conjugate Vaccine 20-Valent (PCV20)             Follow Up   Return in about 1 year (around 12/18/2024).  Patient was given instructions and counseling regarding his condition or for health maintenance advice. Please see specific information pulled into the AVS if appropriate.

## 2023-12-18 NOTE — ASSESSMENT & PLAN NOTE
Hypertension is improving with treatment.  Continue current treatment regimen.  Blood pressure will be reassessed at the next regular appointment.  Patient continue follow-up with cardiology

## 2023-12-18 NOTE — ASSESSMENT & PLAN NOTE
Diabetes is unchanged.   Dietary recommendations for ADA diet.  Regular aerobic exercise.  Diabetes will be reassessed in 6 months.

## 2023-12-19 LAB
ALBUMIN SERPL-MCNC: 4.6 G/DL (ref 3.8–4.8)
ALBUMIN/GLOB SERPL: 2.1 {RATIO} (ref 1.2–2.2)
ALP SERPL-CCNC: 72 IU/L (ref 44–121)
ALT SERPL-CCNC: 16 IU/L (ref 0–44)
AST SERPL-CCNC: 21 IU/L (ref 0–40)
BILIRUB SERPL-MCNC: 0.4 MG/DL (ref 0–1.2)
BUN SERPL-MCNC: 19 MG/DL (ref 8–27)
BUN/CREAT SERPL: 19 (ref 10–24)
CALCIUM SERPL-MCNC: 9.4 MG/DL (ref 8.6–10.2)
CHLORIDE SERPL-SCNC: 106 MMOL/L (ref 96–106)
CO2 SERPL-SCNC: 20 MMOL/L (ref 20–29)
CREAT SERPL-MCNC: 1.02 MG/DL (ref 0.76–1.27)
EGFRCR SERPLBLD CKD-EPI 2021: 74 ML/MIN/1.73
GLOBULIN SER CALC-MCNC: 2.2 G/DL (ref 1.5–4.5)
GLUCOSE SERPL-MCNC: 113 MG/DL (ref 70–99)
HBA1C MFR BLD: 7.2 % (ref 4.8–5.6)
POTASSIUM SERPL-SCNC: 5.1 MMOL/L (ref 3.5–5.2)
PROT SERPL-MCNC: 6.8 G/DL (ref 6–8.5)
SODIUM SERPL-SCNC: 142 MMOL/L (ref 134–144)
TSH SERPL DL<=0.005 MIU/L-ACNC: 0.75 UIU/ML (ref 0.45–4.5)

## 2023-12-27 ENCOUNTER — TELEPHONE (OUTPATIENT)
Dept: FAMILY MEDICINE CLINIC | Facility: CLINIC | Age: 78
End: 2023-12-27
Payer: MEDICARE

## 2023-12-27 NOTE — TELEPHONE ENCOUNTER
Caller: Alen Culp    Relationship: Self    Best call back number:  [phone]       Who are you requesting to speak with (clinical staff, provider,  specific staff member): BRAD MORRIS    What was the call regarding: LAB RESULTS FROM 12/18/23.   ALSO PLEASE MAIL A COPY TO PATIENT AT [address] [place] [id number] .  THANK YOU.    Is it okay if the provider responds through [email or web address]: NO

## 2024-02-20 ENCOUNTER — OFFICE VISIT (OUTPATIENT)
Dept: CARDIOLOGY | Facility: CLINIC | Age: 79
End: 2024-02-20
Payer: MEDICARE

## 2024-02-20 VITALS
RESPIRATION RATE: 16 BRPM | BODY MASS INDEX: 24.64 KG/M2 | HEIGHT: 67 IN | OXYGEN SATURATION: 94 % | TEMPERATURE: 98 F | WEIGHT: 157 LBS | SYSTOLIC BLOOD PRESSURE: 120 MMHG | DIASTOLIC BLOOD PRESSURE: 80 MMHG | HEART RATE: 66 BPM

## 2024-02-20 DIAGNOSIS — Z12.5 SCREENING PSA (PROSTATE SPECIFIC ANTIGEN): ICD-10-CM

## 2024-02-20 DIAGNOSIS — I25.10 CAD S/P PERCUTANEOUS CORONARY ANGIOPLASTY: Primary | ICD-10-CM

## 2024-02-20 DIAGNOSIS — I65.21 CAROTID STENOSIS, ASYMPTOMATIC, RIGHT: ICD-10-CM

## 2024-02-20 DIAGNOSIS — I10 ESSENTIAL HYPERTENSION: ICD-10-CM

## 2024-02-20 DIAGNOSIS — E11.9 TYPE 2 DIABETES MELLITUS WITHOUT COMPLICATION, WITHOUT LONG-TERM CURRENT USE OF INSULIN: ICD-10-CM

## 2024-02-20 DIAGNOSIS — E78.5 HYPERLIPIDEMIA LDL GOAL <70: ICD-10-CM

## 2024-02-20 DIAGNOSIS — Z98.61 CAD S/P PERCUTANEOUS CORONARY ANGIOPLASTY: Primary | ICD-10-CM

## 2024-02-20 PROCEDURE — 36415 COLL VENOUS BLD VENIPUNCTURE: CPT | Performed by: INTERNAL MEDICINE

## 2024-02-20 PROCEDURE — 3074F SYST BP LT 130 MM HG: CPT | Performed by: INTERNAL MEDICINE

## 2024-02-20 PROCEDURE — 1159F MED LIST DOCD IN RCRD: CPT | Performed by: INTERNAL MEDICINE

## 2024-02-20 PROCEDURE — 99214 OFFICE O/P EST MOD 30 MIN: CPT | Performed by: INTERNAL MEDICINE

## 2024-02-20 PROCEDURE — 1160F RVW MEDS BY RX/DR IN RCRD: CPT | Performed by: INTERNAL MEDICINE

## 2024-02-20 PROCEDURE — 3079F DIAST BP 80-89 MM HG: CPT | Performed by: INTERNAL MEDICINE

## 2024-02-20 NOTE — PROGRESS NOTES
Venipuncture Blood Specimen Collection  Venipuncture performed in clinic by Raven Guaman MA with good hemostasis. Patient tolerated the procedure well without complications.   02/20/24   Raven Guaman MA

## 2024-02-20 NOTE — PROGRESS NOTES
[facility]  [facility]IOLOGY  [address] [place] [address]  Dept: [phone]  Dept Fax: [phone]    Alen Culp  1945    Follow Up Office Visit Note    History of Present Illness:  Alen Culp is a 78 y.o. male who presents to the clinic for Follow-up.CAD-He denies any chest pain, has prior stents to LAD and RCA in , on ASA., since then no complaints, normal EF    The following portions of the patient's history were reviewed and updated as appropriate: allergies, current medications, past family history, past medical history, past social history, past surgical history, and problem list.    Medications:  aspirin  atorvastatin  clopidogrel  GNP B-COMPLEX PLUS VITAMIN C PO  levothyroxine  losartan  metFORMIN  metoprolol tartrate    Subjective  No Known Allergies     Past Medical History:   Diagnosis Date    CAD (coronary artery disease)     Coronary arteriosclerosis in native artery     Diabetes     DJD (degenerative joint disease)     ED (erectile dysfunction)     Hyperlipidemia        Past Surgical History:   Procedure Laterality Date    APPENDECTOMY         Family History   Problem Relation Age of Onset    Coronary artery disease Father     Diabetes Father     Hypertension Father     Coronary artery disease Other     Lung cancer Other         Social History     Socioeconomic History    Marital status:    Tobacco Use    Smoking status: Former     Packs/day: 1.00     Years: 10.00     Additional pack years: 0.00     Total pack years: 10.00     Types: Cigarettes     Start date:      Quit date:      Years since quittin.1     Passive exposure: Past    Smokeless tobacco: Never   Vaping Use    Vaping Use: Never used   Substance and Sexual Activity    Alcohol use: Yes     Alcohol/week: 1.0 standard drink of alcohol     Types: 1 Standard drinks or equivalent per week    Drug use: Never    Sexual activity: Defer       Review of Systems  "  Constitutional: Negative.    HENT: Negative.     Respiratory: Negative.     Cardiovascular: Negative.    Endocrine: Negative.    Genitourinary: Negative.    Musculoskeletal: Negative.    Skin: Negative.    Allergic/Immunologic: Negative.    Neurological: Negative.    Hematological: Negative.    Psychiatric/Behavioral: Negative.       Cardiovascular Procedures    ECHO/MUGA:  STRESS TESTS:   CARDIAC CATH:   DEVICES:   HOLTER:   CT/MRI:   VASCULAR:   CARDIOTHORACIC:     Objective  Vitals:    02/20/24 0953   BP: 120/80   BP Location: Right arm   Patient Position: Sitting   Cuff Size: Adult   Pulse: 66   Resp: 16   Temp: 98 °F (36.7 °C)   TempSrc: Infrared   SpO2: 94%   Weight: 71.2 kg (157 lb)   Height: 170.2 cm (67\")   PainSc: 0-No pain     Body mass index is 24.59 kg/m².     Physical Exam  Vitals reviewed.   Constitutional:       Appearance: Healthy appearance. Not in distress.   Eyes:      Pupils: Pupils are equal, round, and reactive to light.   HENT:    Mouth/Throat:      Pharynx: Oropharynx is clear.   Neck:      Thyroid: Thyroid normal.      Vascular: No JVR. JVD normal.   Pulmonary:      Effort: Pulmonary effort is normal.      Breath sounds: Normal breath sounds. No wheezing. No rhonchi. No rales.   Chest:      Chest wall: Not tender to palpatation.   Cardiovascular:      PMI at left midclavicular line. Normal rate. Regular rhythm. Normal S1. Normal S2.       Murmurs: There is no murmur.      No gallop.  No click. No rub.   Pulses:     Intact distal pulses.      Carotid: 3+ bilaterally.     Radial: 3+ bilaterally.     Femoral: 3+ bilaterally.     Dorsalis pedis: 3+ bilaterally.     Posterior tibial: 3+ bilaterally.  Edema:     Peripheral edema absent.   Abdominal:      General: Bowel sounds are normal.      Palpations: Abdomen is soft.      Tenderness: There is no abdominal tenderness.   Musculoskeletal: Normal range of motion.         General: No tenderness.      Cervical back: Normal range of motion and " neck supple. Skin:     General: Skin is warm and dry.   Neurological:      General: No focal deficit present.      Mental Status: Alert and oriented to person, place and time.        Diagnostic Data  Procedures    Assessment and Plan  Diagnoses and all orders for this visit:    CAD S/P percutaneous coronary angioplasty- no complaints, doing good on ASA    Carotid stenosis, asymptomatic, right- Mild disease by doppler    Essential hypertension- BP is good 120.70 on Losartan 50 mg    Hyperlipidemia LDL goal <70- Tolerates well Lipitor 20 mg, will get a Lipid    Type 2 diabetes mellitus without complication, without long-term current use of insulin- He might need a second meds for diabetes., his last A!C was 7,2         Return in about 6 months (around 8/20/2024) for Recheck with Dr. Cooper.    Sundeep Cooper MD  02/20/2024

## 2024-02-21 LAB
ALBUMIN SERPL-MCNC: 4.3 G/DL (ref 3.8–4.8)
ALBUMIN/GLOB SERPL: 1.9 {RATIO} (ref 1.2–2.2)
ALP SERPL-CCNC: 87 IU/L (ref 44–121)
ALT SERPL-CCNC: 13 IU/L (ref 0–44)
AST SERPL-CCNC: 18 IU/L (ref 0–40)
BASOPHILS # BLD AUTO: 0 X10E3/UL (ref 0–0.2)
BASOPHILS NFR BLD AUTO: 1 %
BILIRUB SERPL-MCNC: 0.4 MG/DL (ref 0–1.2)
BUN SERPL-MCNC: 17 MG/DL (ref 8–27)
BUN/CREAT SERPL: 17 (ref 10–24)
CALCIUM SERPL-MCNC: 9.1 MG/DL (ref 8.6–10.2)
CHLORIDE SERPL-SCNC: 107 MMOL/L (ref 96–106)
CHOLEST SERPL-MCNC: 107 MG/DL (ref 100–199)
CK SERPL-CCNC: 80 U/L (ref 41–331)
CO2 SERPL-SCNC: 22 MMOL/L (ref 20–29)
CREAT SERPL-MCNC: 1.02 MG/DL (ref 0.76–1.27)
EGFRCR SERPLBLD CKD-EPI 2021: 74 ML/MIN/1.73
EOSINOPHIL # BLD AUTO: 0.1 X10E3/UL (ref 0–0.4)
EOSINOPHIL NFR BLD AUTO: 1 %
ERYTHROCYTE [DISTWIDTH] IN BLOOD BY AUTOMATED COUNT: 11.9 % (ref 11.6–15.4)
GLOBULIN SER CALC-MCNC: 2.3 G/DL (ref 1.5–4.5)
GLUCOSE SERPL-MCNC: 121 MG/DL (ref 70–99)
HBA1C MFR BLD: 7 % (ref 4.8–5.6)
HCT VFR BLD AUTO: 35.5 % (ref 37.5–51)
HDLC SERPL-MCNC: 54 MG/DL
HGB BLD-MCNC: 11.9 G/DL (ref 13–17.7)
IMM GRANULOCYTES # BLD AUTO: 0 X10E3/UL (ref 0–0.1)
IMM GRANULOCYTES NFR BLD AUTO: 0 %
LDLC SERPL CALC-MCNC: 37 MG/DL (ref 0–99)
LYMPHOCYTES # BLD AUTO: 1.1 X10E3/UL (ref 0.7–3.1)
LYMPHOCYTES NFR BLD AUTO: 22 %
MCH RBC QN AUTO: 32.2 PG (ref 26.6–33)
MCHC RBC AUTO-ENTMCNC: 33.5 G/DL (ref 31.5–35.7)
MCV RBC AUTO: 96 FL (ref 79–97)
MONOCYTES # BLD AUTO: 0.5 X10E3/UL (ref 0.1–0.9)
MONOCYTES NFR BLD AUTO: 10 %
NEUTROPHILS # BLD AUTO: 3.2 X10E3/UL (ref 1.4–7)
NEUTROPHILS NFR BLD AUTO: 66 %
PLATELET # BLD AUTO: 188 X10E3/UL (ref 150–450)
POTASSIUM SERPL-SCNC: 4.5 MMOL/L (ref 3.5–5.2)
PROT SERPL-MCNC: 6.6 G/DL (ref 6–8.5)
PSA SERPL-MCNC: 11.1 NG/ML (ref 0–4)
RBC # BLD AUTO: 3.69 X10E6/UL (ref 4.14–5.8)
SODIUM SERPL-SCNC: 142 MMOL/L (ref 134–144)
TRIGL SERPL-MCNC: 78 MG/DL (ref 0–149)
VLDLC SERPL CALC-MCNC: 16 MG/DL (ref 5–40)
WBC # BLD AUTO: 4.9 X10E3/UL (ref 3.4–10.8)

## 2024-02-22 ENCOUNTER — TELEPHONE (OUTPATIENT)
Dept: CARDIOLOGY | Facility: CLINIC | Age: 79
End: 2024-02-22
Payer: MEDICARE

## 2024-02-22 DIAGNOSIS — R97.20 ELEVATED PSA: Primary | ICD-10-CM

## 2024-02-22 NOTE — TELEPHONE ENCOUNTER
Spoke with Nemesio Robbi and went over recent lab work results.  Your lipids are good and your A1C is alittle better at 7.0.  Your PSA level is 11 so please speak with Payal Smyth, your PCP.  I advised I would also send Libra a copy and patient requested a paper copy to be sent to him.  He verbalized understanding.

## 2024-02-22 NOTE — TELEPHONE ENCOUNTER
----- Message from Sundeep Cooper MD sent at 2/21/2024  4:36 PM EST -----  PSA is 11, please see PCP,Lipids are good, A!C is 7, he is also mild anemic HB 11,9

## 2024-05-03 ENCOUNTER — TELEPHONE (OUTPATIENT)
Dept: FAMILY MEDICINE CLINIC | Facility: CLINIC | Age: 79
End: 2024-05-03
Payer: MEDICARE

## 2024-05-03 DIAGNOSIS — E03.9 ACQUIRED HYPOTHYROIDISM: ICD-10-CM

## 2024-05-03 RX ORDER — LEVOTHYROXINE SODIUM 0.07 MG/1
75 TABLET ORAL DAILY
Qty: 90 TABLET | Refills: 2 | Status: SHIPPED | OUTPATIENT
Start: 2024-05-03

## 2024-05-03 NOTE — TELEPHONE ENCOUNTER
Refill Request  Medication: Synthroid tab 75 MCG  Directions: Take 1 tablet by mouth daily  Last Fill Date: 03/08/2024

## 2024-05-28 ENCOUNTER — TELEPHONE (OUTPATIENT)
Dept: CARDIOLOGY | Facility: CLINIC | Age: 79
End: 2024-05-28
Payer: MEDICARE

## 2024-05-28 NOTE — TELEPHONE ENCOUNTER
Per Yarely with [facility] called to get authorization to stop Plavix 5 days if possible. He can stay on aspirin, for prostate biopsy. Fax# [phone],   # [phone].

## 2024-05-30 NOTE — TELEPHONE ENCOUNTER
Left a message for Mr Culp to call back and speak with Dorinda.  I am calling because urologogy is needing to clearance to stop your plavix for your procedure.  Per Dr. Cooper you can stop the plavix now and you do not need to restart even after procedure.  Just take the aspirin.  Please call if any questions.  Thank you. Letter sent to urology.

## 2024-06-20 RX ORDER — LOSARTAN POTASSIUM 50 MG/1
50 TABLET ORAL DAILY
Qty: 90 TABLET | Refills: 3 | Status: SHIPPED | OUTPATIENT
Start: 2024-06-20

## 2024-08-20 ENCOUNTER — OFFICE VISIT (OUTPATIENT)
Dept: CARDIOLOGY | Facility: CLINIC | Age: 79
End: 2024-08-20
Payer: MEDICARE

## 2024-08-20 VITALS
BODY MASS INDEX: 24.64 KG/M2 | RESPIRATION RATE: 18 BRPM | SYSTOLIC BLOOD PRESSURE: 148 MMHG | HEIGHT: 67 IN | DIASTOLIC BLOOD PRESSURE: 86 MMHG | WEIGHT: 157 LBS | HEART RATE: 87 BPM | OXYGEN SATURATION: 94 %

## 2024-08-20 DIAGNOSIS — I65.21 CAROTID STENOSIS, ASYMPTOMATIC, RIGHT: ICD-10-CM

## 2024-08-20 DIAGNOSIS — Z98.61 CAD S/P PERCUTANEOUS CORONARY ANGIOPLASTY: Primary | ICD-10-CM

## 2024-08-20 DIAGNOSIS — E11.9 TYPE 2 DIABETES MELLITUS WITHOUT COMPLICATION, WITHOUT LONG-TERM CURRENT USE OF INSULIN: ICD-10-CM

## 2024-08-20 DIAGNOSIS — I25.10 CAD S/P PERCUTANEOUS CORONARY ANGIOPLASTY: Primary | ICD-10-CM

## 2024-08-20 DIAGNOSIS — E78.5 HYPERLIPIDEMIA LDL GOAL <70: ICD-10-CM

## 2024-08-20 DIAGNOSIS — I10 ESSENTIAL HYPERTENSION: ICD-10-CM

## 2024-08-20 RX ORDER — BICALUTAMIDE 50 MG/1
50 TABLET, FILM COATED ORAL DAILY
COMMUNITY
Start: 2024-08-09

## 2024-08-20 NOTE — PROGRESS NOTES
[facility]  [facility] CARDIOLOG[facility]  [address] [place] [address]  Dept: [phone]  Dept Fax: [phone]    Alen Culp  1945    Follow Up Office Visit Note    History of Present Illness:  Alen Culp is a 78 y.o. male who presents to the clinic for Follow-up.CAD- He denies any chest pain, SOB,  he has prior stents, to LAD and RCA in , will d.,c Plavix, will keep just ASA, he has been recently dx with prostate cancer     The following portions of the patient's history were reviewed and updated as appropriate: allergies, current medications, past family history, past medical history, past social history, past surgical history, and problem list.    Medications:  aspirin  atorvastatin  bicalutamide  GNP B-COMPLEX PLUS VITAMIN C PO  levothyroxine  losartan  metFORMIN  metoprolol tartrate    Subjective  No Known Allergies     Past Medical History:   Diagnosis Date   • CAD (coronary artery disease)    • Coronary arteriosclerosis in native artery    • Diabetes    • DJD (degenerative joint disease)    • ED (erectile dysfunction)    • Hyperlipidemia        Past Surgical History:   Procedure Laterality Date   • APPENDECTOMY         Family History   Problem Relation Age of Onset   • Coronary artery disease Father    • Diabetes Father    • Hypertension Father    • Coronary artery disease Other    • Lung cancer Other         Social History     Socioeconomic History   • Marital status:    Tobacco Use   • Smoking status: Former     Current packs/day: 0.00     Average packs/day: 1 pack/day for 10.0 years (10.0 ttl pk-yrs)     Types: Cigarettes     Start date:      Quit date: 1960     Years since quittin.6     Passive exposure: Past   • Smokeless tobacco: Never   Vaping Use   • Vaping status: Never Used   Substance and Sexual Activity   • Alcohol use: Yes     Alcohol/week: 1.0 standard drink of alcohol     Types: 1 Standard drinks or equivalent per week   • Drug  "use: Never   • Sexual activity: Defer       Review of Systems   Constitutional: Negative.    HENT: Negative.     Respiratory: Negative.     Cardiovascular: Negative.    Endocrine: Negative.    Genitourinary: Negative.    Musculoskeletal: Negative.    Skin: Negative.    Allergic/Immunologic: Negative.    Neurological: Negative.    Hematological: Negative.    Psychiatric/Behavioral: Negative.       Cardiovascular Procedures    ECHO/MUGA:  STRESS TESTS:   CARDIAC CATH:   DEVICES:   HOLTER:   CT/MRI:   VASCULAR:   CARDIOTHORACIC:     Objective  Vitals:    08/20/24 0855   BP: 148/86   BP Location: Right arm   Patient Position: Lying   Cuff Size: Adult   Pulse: 87   Resp: 18   SpO2: 94%   Weight: 71.2 kg (157 lb)   Height: 170.2 cm (67\")   PainSc: 0-No pain     Body mass index is 24.59 kg/m².     Physical Exam  Vitals reviewed.   Constitutional:       Appearance: Healthy appearance. Not in distress.   Eyes:      Pupils: Pupils are equal, round, and reactive to light.   HENT:    Mouth/Throat:      Pharynx: Oropharynx is clear.   Neck:      Thyroid: Thyroid normal.      Vascular: No JVR. JVD normal.   Pulmonary:      Effort: Pulmonary effort is normal.      Breath sounds: Normal breath sounds. No wheezing. No rhonchi. No rales.   Chest:      Chest wall: Not tender to palpatation.   Cardiovascular:      PMI at left midclavicular line. Normal rate. Regular rhythm. Normal S1. Normal S2.       Murmurs: There is no murmur.      No gallop.  No click. No rub.   Pulses:     Intact distal pulses.      Carotid: 3+ bilaterally.     Radial: 3+ bilaterally.     Femoral: 3+ bilaterally.     Dorsalis pedis: 3+ bilaterally.     Posterior tibial: 3+ bilaterally.  Edema:     Peripheral edema absent.   Abdominal:      General: Bowel sounds are normal.      Palpations: Abdomen is soft.      Tenderness: There is no abdominal tenderness.   Musculoskeletal: Normal range of motion.         General: No tenderness.      Cervical back: Normal range " of motion and neck supple. Skin:     General: Skin is warm and dry.   Neurological:      General: No focal deficit present.      Mental Status: Alert and oriented to person, place and time.        Diagnostic Data    ECG 12 Lead    Date/Time: 8/20/2024 9:14 AM  Performed by: Sundeep Cooper MD    Authorized by: Sundeep Cooper MD  Comparison: compared with previous ECG from 8/21/2023  Similar to previous ECG  Rhythm: sinus rhythm  Rate: normal  BPM: 67  QRS axis: normal  Other findings: non-specific ST-T wave changes    Clinical impression: abnormal EKG      Assessment and Plan  Diagnoses and all orders for this visit:    CAD S/P percutaneous coronary angioplasty- No complaints, will keep just asa, has stents 20 years ago, since then no issues    Carotid stenosis, asymptomatic, right- mild disease    Essential hypertension-BP is 110.60 on Losartan 50 mg and Metoprolol 25 bid  -     metoprolol tartrate (LOPRESSOR) 25 MG tablet; TAKE ONE-HALF TABLET BY  MOUTH TWICE DAILY    Hyperlipidemia LDL goal <70- On Lipitor 20 mg daily    Type 2 diabetes mellitus without complication, without long-term current use of insulin    Other orders  -     bicalutamide (CASODEX) 50 MG tablet; Take 1 tablet by mouth Daily.         Return in about 6 months (around 2/20/2025) for Recheck with Dr. Cooper.    Sundeep Cooper MD  08/20/2024

## 2024-10-17 DIAGNOSIS — I10 ESSENTIAL HYPERTENSION, BENIGN: ICD-10-CM

## 2024-10-17 RX ORDER — ATORVASTATIN CALCIUM 20 MG/1
20 TABLET, FILM COATED ORAL DAILY
Qty: 90 TABLET | Refills: 3 | Status: SHIPPED | OUTPATIENT
Start: 2024-10-17

## 2024-12-05 ENCOUNTER — TELEPHONE (OUTPATIENT)
Dept: FAMILY MEDICINE CLINIC | Facility: CLINIC | Age: 79
End: 2024-12-05
Payer: MEDICARE

## 2024-12-05 NOTE — TELEPHONE ENCOUNTER
metFORMIN (GLUCOPHAGE) 500 MG tablet     PATIENT IS NEEDING A REFILL ON THIS MEDICATION HE HAS AN APPT SCHEDULED FOR 12.19.2024 BUT WILL NEED MORE BEFORE THEN. CAN YOU CALL THIS IN TO HOLD HIM OVER UNTIL HIS APPT?    [facility]

## 2024-12-15 DIAGNOSIS — E11.9 TYPE 2 DIABETES MELLITUS WITHOUT COMPLICATION, WITHOUT LONG-TERM CURRENT USE OF INSULIN: ICD-10-CM

## 2024-12-18 DIAGNOSIS — E03.9 ACQUIRED HYPOTHYROIDISM: ICD-10-CM

## 2024-12-19 ENCOUNTER — OFFICE VISIT (OUTPATIENT)
Dept: FAMILY MEDICINE CLINIC | Facility: CLINIC | Age: 79
End: 2024-12-19
Payer: MEDICARE

## 2024-12-19 ENCOUNTER — TELEPHONE (OUTPATIENT)
Dept: FAMILY MEDICINE CLINIC | Facility: CLINIC | Age: 79
End: 2024-12-19

## 2024-12-19 VITALS
HEIGHT: 67 IN | DIASTOLIC BLOOD PRESSURE: 60 MMHG | SYSTOLIC BLOOD PRESSURE: 124 MMHG | HEART RATE: 63 BPM | OXYGEN SATURATION: 99 % | BODY MASS INDEX: 24.78 KG/M2 | WEIGHT: 157.9 LBS

## 2024-12-19 DIAGNOSIS — R35.1 NOCTURIA: ICD-10-CM

## 2024-12-19 DIAGNOSIS — C61 ADENOCARCINOMA OF PROSTATE: ICD-10-CM

## 2024-12-19 DIAGNOSIS — Z00.00 ENCOUNTER FOR ANNUAL WELLNESS VISIT (AWV) IN MEDICARE PATIENT: Primary | ICD-10-CM

## 2024-12-19 DIAGNOSIS — I10 ESSENTIAL HYPERTENSION: ICD-10-CM

## 2024-12-19 DIAGNOSIS — E11.9 TYPE 2 DIABETES MELLITUS WITHOUT COMPLICATION, WITHOUT LONG-TERM CURRENT USE OF INSULIN: ICD-10-CM

## 2024-12-19 DIAGNOSIS — E03.9 ACQUIRED HYPOTHYROIDISM: ICD-10-CM

## 2024-12-19 PROCEDURE — G0439 PPPS, SUBSEQ VISIT: HCPCS | Performed by: PHYSICIAN ASSISTANT

## 2024-12-19 PROCEDURE — 3078F DIAST BP <80 MM HG: CPT | Performed by: PHYSICIAN ASSISTANT

## 2024-12-19 PROCEDURE — 3074F SYST BP LT 130 MM HG: CPT | Performed by: PHYSICIAN ASSISTANT

## 2024-12-19 PROCEDURE — 99213 OFFICE O/P EST LOW 20 MIN: CPT | Performed by: PHYSICIAN ASSISTANT

## 2024-12-19 PROCEDURE — 1126F AMNT PAIN NOTED NONE PRSNT: CPT | Performed by: PHYSICIAN ASSISTANT

## 2024-12-19 PROCEDURE — 1170F FXNL STATUS ASSESSED: CPT | Performed by: PHYSICIAN ASSISTANT

## 2024-12-19 RX ORDER — LEVOTHYROXINE SODIUM 75 UG/1
75 TABLET ORAL DAILY
Qty: 90 TABLET | Refills: 3 | OUTPATIENT
Start: 2024-12-19

## 2024-12-19 RX ORDER — LEVOTHYROXINE SODIUM 75 UG/1
75 TABLET ORAL DAILY
Qty: 90 TABLET | Refills: 4 | Status: SHIPPED | OUTPATIENT
Start: 2024-12-19

## 2024-12-19 NOTE — PROGRESS NOTES
Subjective   The ABCs of the Annual Wellness Visit  Medicare Wellness Visit      Alen Culp is a 79 y.o. patient who presents for a Medicare Wellness Visit.    The following portions of the patient's history were reviewed and   updated as appropriate: allergies, current medications, past family history, past medical history, past social history, past surgical history, and problem list.    Compared to one year ago, the patient's physical   health is the same.  Compared to one year ago, the patient's mental   health is the same.    Recent Hospitalizations:  He was not admitted to the hospital during the last year.     Current Medical Providers:  Patient Care Team:  Payal Smyth PA-C as PCP - General (Physician Assistant)  Sundeep Cooper MD as Consulting Physician (Cardiology)  Benton Oneal MD as Consulting Physician (Radiation Oncology)  Varghese Henriquez MD (Urology)    Outpatient Medications Prior to Visit   Medication Sig Dispense Refill    aspirin 81 MG EC tablet Take 1 tablet by mouth Daily.      atorvastatin (LIPITOR) 20 MG tablet TAKE 1 TABLET BY MOUTH DAILY 90 tablet 3    B Complex-C-Calcium (GNP B-COMPLEX PLUS VITAMIN C PO) Take  by mouth.      losartan (COZAAR) 50 MG tablet TAKE 1 TABLET BY MOUTH DAILY 90 tablet 3    metoprolol tartrate (LOPRESSOR) 25 MG tablet TAKE ONE-HALF TABLET BY  MOUTH TWICE DAILY 90 tablet 3    levothyroxine (Synthroid) 75 MCG tablet Take 1 tablet by mouth Daily. 90 tablet 2    metFORMIN (GLUCOPHAGE) 500 MG tablet Take 2 tablets by mouth 2 (Two) Times a Day With Meals. 360 tablet 0    bicalutamide (CASODEX) 50 MG tablet Take 1 tablet by mouth Daily. (Patient not taking: Reported on 12/19/2024)       No facility-administered medications prior to visit.     No opioid medication identified on active medication list. I have reviewed chart for other potential  high risk medication/s and harmful drug interactions in the elderly.      Aspirin is on active  "medication list. Aspirin use is indicated based on review of current medical condition/s. Pros and cons of this therapy have been discussed today. Benefits of this medication outweigh potential harm.  Patient has been encouraged to continue taking this medication.  .      Patient Active Problem List   Diagnosis    CAD S/P percutaneous coronary angioplasty    Essential hypertension    Hyperlipidemia LDL goal <70    Type 2 diabetes mellitus, without long-term current use of insulin    Carotid stenosis, asymptomatic, right    Encounter for annual wellness visit (AWV) in Medicare patient    Encounter for immunization    Adenocarcinoma of prostate    Nocturia     Advance Care Planning Advance Directive is not on file.  ACP discussion was held with the patient during this visit. Patient has an advance directive (not in EMR), copy requested.            Objective   Vitals:    24 0831   BP: 124/60   BP Location: Left arm   Patient Position: Sitting   Cuff Size: Adult   Pulse: 63   SpO2: 99%   Weight: 71.6 kg (157 lb 14.4 oz)   Height: 170.2 cm (67\")   PainSc: 0-No pain       Estimated body mass index is 24.73 kg/m² as calculated from the following:    Height as of this encounter: 170.2 cm (67\").    Weight as of this encounter: 71.6 kg (157 lb 14.4 oz).    BMI is within normal parameters. No other follow-up for BMI required.           Does the patient have evidence of cognitive impairment? No                                                                                                Health  Risk Assessment    Smoking Status:  Social History     Tobacco Use   Smoking Status Former    Current packs/day: 0.00    Average packs/day: 1 pack/day for 10.0 years (10.0 ttl pk-yrs)    Types: Cigarettes    Start date:     Quit date: 1960    Years since quittin.0    Passive exposure: Past   Smokeless Tobacco Never     Alcohol Consumption:  Social History     Substance and Sexual Activity   Alcohol Use Yes    " Alcohol/week: 1.0 standard drink of alcohol    Types: 1 Standard drinks or equivalent per week       Fall Risk Screen  WAYNE Fall Risk Assessment was completed, and patient is at LOW risk for falls.Assessment completed on:2024    Depression Screening   Little interest or pleasure in doing things? Not at all   Feeling down, depressed, or hopeless? Not at all   PHQ-2 Total Score 0      Health Habits and Functional and Cognitive Screenin/19/2024     8:30 AM   Functional & Cognitive Status   Do you have difficulty preparing food and eating? No   Do you have difficulty bathing yourself, getting dressed or grooming yourself? No   Do you have difficulty using the toilet? No   Do you have difficulty moving around from place to place? No   Do you have trouble with steps or getting out of a bed or a chair? No   Current Diet Well Balanced Diet   Dental Exam Up to date   Eye Exam Unknown   Exercise (times per week) 7 times per week   Current Exercises Include Walking   Do you need help using the phone?  No   Are you deaf or do you have serious difficulty hearing?  No   Do you need help to go to places out of walking distance? No   Do you need help shopping? No   Do you need help preparing meals?  No   Do you need help with housework?  No   Do you need help with laundry? No   Do you need help taking your medications? No   Do you need help managing money? No   Do you ever drive or ride in a car without wearing a seat belt? No   Have you felt unusual stress, anger or loneliness in the last month? No   Who do you live with? Alone   If you need help, do you have trouble finding someone available to you? No   Have you been bothered in the last four weeks by sexual problems? No   Do you have difficulty concentrating, remembering or making decisions? No           Age-appropriate Screening Schedule:  Refer to the list below for future screening recommendations based on patient's age, sex and/or medical conditions.  Orders for these recommended tests are listed in the plan section. The patient has been provided with a written plan.    Health Maintenance List  Health Maintenance   Topic Date Due    DIABETIC FOOT EXAM  Never done    TDAP/TD VACCINES (1 - Tdap) Never done    ZOSTER VACCINE (1 of 2) Never done    DIABETIC EYE EXAM  05/07/2019    HEMOGLOBIN A1C  08/20/2024    ANNUAL WELLNESS VISIT  12/18/2024    LIPID PANEL  02/20/2025    COVID-19 Vaccine  Completed    RSV Vaccine - Adults  Completed    INFLUENZA VACCINE  Completed    Pneumococcal Vaccine 65+  Completed                                                                                                                                                CMS Preventative Services Quick Reference  Risk Factors Identified During Encounter  None Identified    The above risks/problems have been discussed with the patient.  Pertinent information has been shared with the patient in the After Visit Summary.  An After Visit Summary and PPPS were made available to the patient.    Follow Up:   Next Medicare Wellness visit to be scheduled in 1 year.         Additional E&M Note during same encounter follows:  Patient has additional, significant, and separately identifiable condition(s)/problem(s) that require work above and beyond the Medicare Wellness Visit     Chief Complaint  Medicare Wellness-subsequent (ALREADY HAD FLU VACCINE)    Subjective   HPI  Alen is also being seen today for additional medical problem/s.  Patient reports having type 2 diabetes.  States he takes metformin and his sugar is well-controlled.  Request refills today.    Patient reports having hypothyroidism.  States he takes levothyroxine daily denies missing any doses.    Patient reports he is fasting for labs today.    Patient reports he is being treated for prostate cancer.  States he continues follow-up with urology.  Reports he has now finished his last radiation treatment.      Denies any questions or  "complaints this date.                      Objective   Vital Signs:  /60 (BP Location: Left arm, Patient Position: Sitting, Cuff Size: Adult)   Pulse 63   Ht 170.2 cm (67\")   Wt 71.6 kg (157 lb 14.4 oz)   SpO2 99%   BMI 24.73 kg/m²   Physical Exam  Vitals and nursing note reviewed.   Constitutional:       General: He is not in acute distress.     Appearance: Normal appearance.   HENT:      Head: Normocephalic.      Right Ear: Hearing normal.      Left Ear: Hearing normal.   Eyes:      Pupils: Pupils are equal, round, and reactive to light.   Cardiovascular:      Rate and Rhythm: Normal rate and regular rhythm.   Pulmonary:      Effort: Pulmonary effort is normal. No respiratory distress.   Skin:     General: Skin is warm and dry.   Neurological:      Mental Status: He is alert.   Psychiatric:         Mood and Affect: Mood normal.                       Assessment and Plan            Encounter for annual wellness visit (AWV) in Medicare patient  Updated annual wellness visit checklist.  Immunizations discussed.  Screening up-to-date.  Recommend yearly dental and eye exams. Also discussed monitoring of blood pressure and lipids. We addressed patient self-assessment of health status, frailty, and physical functioning. We reviewed psychosocial risks, behavioral risks, instrumental activities of daily living, and patient health risk assessment. Patient was given a personalized prevention plan           Acquired hypothyroidism    Orders:    levothyroxine (Synthroid) 75 MCG tablet; Take 1 tablet by mouth Daily. For thyroid    TSH; Future    TSH    Type 2 diabetes mellitus without complication, without long-term current use of insulin  Will continue current treatment plan with metformin.  Hemoglobin A1c ordered today    Orders:    Hemoglobin A1c; Future    metFORMIN (GLUCOPHAGE) 500 MG tablet; Take 2 tablets by mouth 2 (Two) Times a Day With Meals.    Hemoglobin A1c    Essential hypertension  Hypertension is " stable and controlled  Continue current treatment regimen.  Blood pressure will be reassessed  with cardiology .    Orders:    CBC & Differential; Future    Comprehensive Metabolic Panel; Future    Lipid Panel; Future    Lipid Panel    Comprehensive Metabolic Panel    CBC & Differential    Adenocarcinoma of prostate  Patient will continue current treatment plan with oncology and urology.       Nocturia  Patient will continue current treatment plan with urology               Follow Up   Return in about 1 year (around 12/19/2025) for Medicare Wellness.  Patient was given instructions and counseling regarding his condition or for health maintenance advice. Please see specific information pulled into the AVS if appropriate.

## 2024-12-19 NOTE — ASSESSMENT & PLAN NOTE
Hypertension is stable and controlled  Continue current treatment regimen.  Blood pressure will be reassessed with cardiology.    Orders:    CBC & Differential; Future    Comprehensive Metabolic Panel; Future    Lipid Panel; Future    Lipid Panel    Comprehensive Metabolic Panel    CBC & Differential

## 2024-12-19 NOTE — TELEPHONE ENCOUNTER
Refill protocol failed.  Patient has appointment today with provider. Will address at his appointment.

## 2024-12-19 NOTE — ASSESSMENT & PLAN NOTE
Updated annual wellness visit checklist.  Immunizations discussed.  Screening up-to-date.  Recommend yearly dental and eye exams. Also discussed monitoring of blood pressure and lipids. We addressed patient self-assessment of health status, frailty, and physical functioning. We reviewed psychosocial risks, behavioral risks, instrumental activities of daily living, and patient health risk assessment. Patient was given a personalized prevention plan

## 2024-12-19 NOTE — ASSESSMENT & PLAN NOTE
Will continue current treatment plan with metformin.  Hemoglobin A1c ordered today    Orders:    Hemoglobin A1c; Future    metFORMIN (GLUCOPHAGE) 500 MG tablet; Take 2 tablets by mouth 2 (Two) Times a Day With Meals.    Hemoglobin A1c

## 2024-12-19 NOTE — TELEPHONE ENCOUNTER
Caller: Alen Culp    Relationship: Self    Best call back number: [phone]     What is the best time to reach you: ANYTIME    Who are you requesting to speak with (clinical staff, provider,  specific staff member): CLINICAL STAFF    What was the call regarding: THE PATIENT HAS QUESTIONS ABOUT WHEN HIS NEXT APPOINTMENT WILL BE? HE STATED THAT ONE PERSON SAID ONE YEAR FROM TODAY AND ANOTHER PERSON SAID FOUR MONTHS FROM TODAY.

## 2024-12-20 LAB
ALBUMIN SERPL-MCNC: 4.1 G/DL (ref 3.7–4.7)
ALP SERPL-CCNC: 73 IU/L (ref 44–121)
ALT SERPL-CCNC: 16 IU/L (ref 0–44)
AST SERPL-CCNC: 18 IU/L (ref 0–40)
BASOPHILS # BLD AUTO: 0 X10E3/UL (ref 0–0.2)
BASOPHILS NFR BLD AUTO: 1 %
BILIRUB SERPL-MCNC: 0.4 MG/DL (ref 0–1.2)
BUN SERPL-MCNC: 20 MG/DL (ref 8–27)
BUN/CREAT SERPL: 21 (ref 10–24)
CALCIUM SERPL-MCNC: 9.3 MG/DL (ref 8.6–10.2)
CHLORIDE SERPL-SCNC: 107 MMOL/L (ref 96–106)
CHOLEST SERPL-MCNC: 133 MG/DL (ref 100–199)
CO2 SERPL-SCNC: 22 MMOL/L (ref 20–29)
CREAT SERPL-MCNC: 0.97 MG/DL (ref 0.76–1.27)
EGFRCR SERPLBLD CKD-EPI 2021: 78 ML/MIN/1.73
EOSINOPHIL # BLD AUTO: 0.1 X10E3/UL (ref 0–0.4)
EOSINOPHIL NFR BLD AUTO: 2 %
ERYTHROCYTE [DISTWIDTH] IN BLOOD BY AUTOMATED COUNT: 12.5 % (ref 11.6–15.4)
GLOBULIN SER CALC-MCNC: 2.2 G/DL (ref 1.5–4.5)
GLUCOSE SERPL-MCNC: 138 MG/DL (ref 70–99)
HBA1C MFR BLD: 7 % (ref 4.8–5.6)
HCT VFR BLD AUTO: 34.1 % (ref 37.5–51)
HDLC SERPL-MCNC: 67 MG/DL
HGB BLD-MCNC: 11.2 G/DL (ref 13–17.7)
IMM GRANULOCYTES # BLD AUTO: 0 X10E3/UL (ref 0–0.1)
IMM GRANULOCYTES NFR BLD AUTO: 0 %
LDLC SERPL CALC-MCNC: 49 MG/DL (ref 0–99)
LYMPHOCYTES # BLD AUTO: 0.6 X10E3/UL (ref 0.7–3.1)
LYMPHOCYTES NFR BLD AUTO: 14 %
MCH RBC QN AUTO: 32 PG (ref 26.6–33)
MCHC RBC AUTO-ENTMCNC: 32.8 G/DL (ref 31.5–35.7)
MCV RBC AUTO: 97 FL (ref 79–97)
MONOCYTES # BLD AUTO: 0.5 X10E3/UL (ref 0.1–0.9)
MONOCYTES NFR BLD AUTO: 11 %
NEUTROPHILS # BLD AUTO: 3.2 X10E3/UL (ref 1.4–7)
NEUTROPHILS NFR BLD AUTO: 72 %
PLATELET # BLD AUTO: 178 X10E3/UL (ref 150–450)
POTASSIUM SERPL-SCNC: 4.6 MMOL/L (ref 3.5–5.2)
PROT SERPL-MCNC: 6.3 G/DL (ref 6–8.5)
RBC # BLD AUTO: 3.5 X10E6/UL (ref 4.14–5.8)
SODIUM SERPL-SCNC: 143 MMOL/L (ref 134–144)
TRIGL SERPL-MCNC: 87 MG/DL (ref 0–149)
TSH SERPL DL<=0.005 MIU/L-ACNC: 0.66 UIU/ML (ref 0.45–4.5)
VLDLC SERPL CALC-MCNC: 17 MG/DL (ref 5–40)
WBC # BLD AUTO: 4.4 X10E3/UL (ref 3.4–10.8)

## 2024-12-27 ENCOUNTER — TELEPHONE (OUTPATIENT)
Dept: FAMILY MEDICINE CLINIC | Facility: CLINIC | Age: 79
End: 2024-12-27
Payer: MEDICARE

## 2024-12-27 NOTE — TELEPHONE ENCOUNTER
Caller: Alen Culp    Relationship: Self    Best call back number: [phone]    What form or medical record are you requesting: LAB RESULTS FROM 12/19/2024    Who is requesting this form or medical record from you: PATIENT    How would you like to receive the form or medical records (pick-up, mail, fax):   If mail, what is the address: HOME ADDRESS    Timeframe paperwork needed: ASAP    Additional notes: PATIENT WOULD LIKE A COPY OF LAB TEST RESULTS MAILED TO HOME ADDRESS.

## 2025-02-12 ENCOUNTER — OFFICE VISIT (OUTPATIENT)
Dept: CARDIOLOGY | Facility: CLINIC | Age: 80
End: 2025-02-12
Payer: MEDICARE

## 2025-02-12 VITALS
WEIGHT: 156 LBS | BODY MASS INDEX: 24.48 KG/M2 | OXYGEN SATURATION: 99 % | HEART RATE: 72 BPM | HEIGHT: 67 IN | SYSTOLIC BLOOD PRESSURE: 130 MMHG | RESPIRATION RATE: 18 BRPM | DIASTOLIC BLOOD PRESSURE: 80 MMHG | TEMPERATURE: 98 F

## 2025-02-12 DIAGNOSIS — Z98.61 CAD S/P PERCUTANEOUS CORONARY ANGIOPLASTY: ICD-10-CM

## 2025-02-12 DIAGNOSIS — I10 ESSENTIAL HYPERTENSION: Primary | ICD-10-CM

## 2025-02-12 DIAGNOSIS — I25.10 CAD S/P PERCUTANEOUS CORONARY ANGIOPLASTY: ICD-10-CM

## 2025-02-12 DIAGNOSIS — E78.5 HYPERLIPIDEMIA LDL GOAL <70: ICD-10-CM

## 2025-02-12 NOTE — PROGRESS NOTES
[facility]  [facility]  [address] [place] [address]  Dept: [phone]  Dept Fax: [phone]    Alen Culp  1945    Follow Up Office Visit Note    History of Present Illness:  Alen Culp is a 79 y.o. male who presents to the clinic for Follow-up.CAD- He denies any CP, SOB, edema, on ASA, prior stents over 20 years ago on ASA EKG sinus Hr 63    The following portions of the patient's history were reviewed and updated as appropriate: allergies, current medications, past family history, past medical history, past social history, past surgical history, and problem list.    Medications:  aspirin  atorvastatin  GNP B-COMPLEX PLUS VITAMIN C PO  levothyroxine  losartan  metFORMIN  metoprolol tartrate    Subjective  No Known Allergies     Past Medical History:   Diagnosis Date   • CAD (coronary artery disease)    • Coronary arteriosclerosis in native artery    • Diabetes    • DJD (degenerative joint disease)    • ED (erectile dysfunction)    • Hyperlipidemia        Past Surgical History:   Procedure Laterality Date   • APPENDECTOMY         Family History   Problem Relation Age of Onset   • Coronary artery disease Father    • Diabetes Father    • Hypertension Father    • Coronary artery disease Other    • Lung cancer Other         Social History     Socioeconomic History   • Marital status:    Tobacco Use   • Smoking status: Former     Current packs/day: 0.00     Average packs/day: 1 pack/day for 10.0 years (10.0 ttl pk-yrs)     Types: Cigarettes     Start date:      Quit date:      Years since quittin.1     Passive exposure: Past   • Smokeless tobacco: Never   Vaping Use   • Vaping status: Never Used   Substance and Sexual Activity   • Alcohol use: Yes     Alcohol/week: 1.0 standard drink of alcohol     Types: 1 Standard drinks or equivalent per week   • Drug use: Never   • Sexual activity: Defer       Review of Systems   Constitutional:  "Negative.    HENT: Negative.     Respiratory: Negative.     Cardiovascular: Negative.    Endocrine: Negative.    Genitourinary: Negative.    Musculoskeletal: Negative.    Skin: Negative.    Allergic/Immunologic: Negative.    Neurological: Negative.    Hematological: Negative.    Psychiatric/Behavioral: Negative.       Cardiovascular Procedures    ECHO/MUGA:  STRESS TESTS:   CARDIAC CATH:   DEVICES:   HOLTER:   CT/MRI:   VASCULAR:   CARDIOTHORACIC:     Objective  Vitals:    02/12/25 0905   BP: 130/80   BP Location: Right arm   Patient Position: Sitting   Cuff Size: Adult   Pulse: 72   Resp: 18   Temp: 98 °F (36.7 °C)   TempSrc: Infrared   SpO2: 99%   Weight: 70.8 kg (156 lb)   Height: 170.2 cm (67\")   PainSc: 0-No pain     Body mass index is 24.43 kg/m².     Physical Exam  Vitals reviewed.   Constitutional:       Appearance: Healthy appearance. Not in distress.   Eyes:      Pupils: Pupils are equal, round, and reactive to light.   HENT:    Mouth/Throat:      Pharynx: Oropharynx is clear.   Neck:      Thyroid: Thyroid normal.      Vascular: No JVR. JVD normal.   Pulmonary:      Effort: Pulmonary effort is normal.      Breath sounds: Normal breath sounds. No wheezing. No rhonchi. No rales.   Chest:      Chest wall: Not tender to palpatation.   Cardiovascular:      PMI at left midclavicular line. Normal rate. Regular rhythm. Normal S1. Normal S2.       Murmurs: There is no murmur.      No gallop.  No click. No rub.   Pulses:     Intact distal pulses.      Carotid: 3+ bilaterally.     Radial: 3+ bilaterally.     Femoral: 3+ bilaterally.     Dorsalis pedis: 3+ bilaterally.     Posterior tibial: 3+ bilaterally.  Edema:     Peripheral edema absent.   Abdominal:      General: Bowel sounds are normal.      Palpations: Abdomen is soft.      Tenderness: There is no abdominal tenderness.   Musculoskeletal: Normal range of motion.         General: No tenderness.      Cervical back: Normal range of motion and neck supple. Skin:    "  General: Skin is warm and dry.   Neurological:      General: No focal deficit present.      Mental Status: Alert and oriented to person, place and time.        Diagnostic Data    ECG 12 Lead    Date/Time: 2/12/2025 9:29 AM  Performed by: Sundeep Cooper MD    Authorized by: Sundeep Cooper MD  Comparison: compared with previous ECG from 8/20/2024  Similar to previous ECG  Rhythm: sinus rhythm  Rate: normal  BPM: 65  QRS axis: normal    Clinical impression: normal ECG      Assessment and Plan  Diagnoses and all orders for this visit:    Essential hypertension- BP is 120.80 on Losartan 50 mg and  Metoprolol 25 bid,    Hyperlipidemia LDL goal <70- Lipids are good Ldl 46 on Lipitor tolerates well    CAD S/P percutaneous coronary angioplasty- No complaints on ASA, prior stents over 20 years ago         Return in about 6 months (around 8/12/2025) for Recheck with Dr. Cooper.    Sundeep Cooper MD  02/12/2025

## 2025-04-10 RX ORDER — LOSARTAN POTASSIUM 50 MG/1
50 TABLET ORAL DAILY
Qty: 90 TABLET | Refills: 0 | Status: SHIPPED | OUTPATIENT
Start: 2025-04-10

## 2025-06-12 RX ORDER — LOSARTAN POTASSIUM 50 MG/1
50 TABLET ORAL DAILY
Qty: 90 TABLET | Refills: 1 | Status: SHIPPED | OUTPATIENT
Start: 2025-06-12

## 2025-06-19 DIAGNOSIS — I10 ESSENTIAL HYPERTENSION: ICD-10-CM

## 2025-06-19 RX ORDER — METOPROLOL TARTRATE 25 MG/1
TABLET, FILM COATED ORAL
Qty: 90 TABLET | Refills: 3 | Status: SHIPPED | OUTPATIENT
Start: 2025-06-19

## 2025-08-06 ENCOUNTER — OFFICE VISIT (OUTPATIENT)
Dept: CARDIOLOGY | Facility: CLINIC | Age: 80
End: 2025-08-06
Payer: MEDICARE

## 2025-08-06 VITALS
HEIGHT: 67 IN | WEIGHT: 155 LBS | OXYGEN SATURATION: 99 % | HEART RATE: 71 BPM | RESPIRATION RATE: 18 BRPM | SYSTOLIC BLOOD PRESSURE: 128 MMHG | DIASTOLIC BLOOD PRESSURE: 74 MMHG | BODY MASS INDEX: 24.33 KG/M2

## 2025-08-06 DIAGNOSIS — E78.5 HYPERLIPIDEMIA LDL GOAL <70: ICD-10-CM

## 2025-08-06 DIAGNOSIS — E11.00 TYPE 2 DIABETES MELLITUS WITH HYPEROSMOLARITY WITHOUT COMA, WITHOUT LONG-TERM CURRENT USE OF INSULIN: ICD-10-CM

## 2025-08-06 DIAGNOSIS — I10 ESSENTIAL HYPERTENSION: ICD-10-CM

## 2025-08-06 DIAGNOSIS — Z98.61 CAD S/P PERCUTANEOUS CORONARY ANGIOPLASTY: Primary | ICD-10-CM

## 2025-08-06 DIAGNOSIS — I65.21 CAROTID STENOSIS, ASYMPTOMATIC, RIGHT: ICD-10-CM

## 2025-08-06 DIAGNOSIS — I25.10 CAD S/P PERCUTANEOUS CORONARY ANGIOPLASTY: Primary | ICD-10-CM

## 2025-08-06 PROCEDURE — 1159F MED LIST DOCD IN RCRD: CPT | Performed by: INTERNAL MEDICINE

## 2025-08-06 PROCEDURE — 3078F DIAST BP <80 MM HG: CPT | Performed by: INTERNAL MEDICINE

## 2025-08-06 PROCEDURE — 1160F RVW MEDS BY RX/DR IN RCRD: CPT | Performed by: INTERNAL MEDICINE

## 2025-08-06 PROCEDURE — 3074F SYST BP LT 130 MM HG: CPT | Performed by: INTERNAL MEDICINE

## 2025-08-06 PROCEDURE — 99214 OFFICE O/P EST MOD 30 MIN: CPT | Performed by: INTERNAL MEDICINE

## 2025-08-06 PROCEDURE — 93000 ELECTROCARDIOGRAM COMPLETE: CPT | Performed by: INTERNAL MEDICINE

## 2025-08-07 ENCOUNTER — RESULTS FOLLOW-UP (OUTPATIENT)
Dept: CARDIOLOGY | Facility: CLINIC | Age: 80
End: 2025-08-07
Payer: MEDICARE

## 2025-08-07 LAB
ALBUMIN SERPL-MCNC: 4.2 G/DL (ref 3.7–4.7)
ALP SERPL-CCNC: 77 IU/L (ref 44–121)
ALT SERPL-CCNC: 12 IU/L (ref 0–44)
AST SERPL-CCNC: 15 IU/L (ref 0–40)
BASOPHILS # BLD AUTO: 0 X10E3/UL (ref 0–0.2)
BASOPHILS NFR BLD AUTO: 0 %
BILIRUB SERPL-MCNC: 0.4 MG/DL (ref 0–1.2)
BUN SERPL-MCNC: 16 MG/DL (ref 8–27)
BUN/CREAT SERPL: 19 (ref 10–24)
CALCIUM SERPL-MCNC: 9.1 MG/DL (ref 8.6–10.2)
CHLORIDE SERPL-SCNC: 107 MMOL/L (ref 96–106)
CHOLEST SERPL-MCNC: 98 MG/DL (ref 100–199)
CK SERPL-CCNC: 103 U/L (ref 30–208)
CO2 SERPL-SCNC: 21 MMOL/L (ref 20–29)
CREAT SERPL-MCNC: 0.84 MG/DL (ref 0.76–1.27)
EGFRCR SERPLBLD CKD-EPI 2021: 88 ML/MIN/1.73
EOSINOPHIL # BLD AUTO: 0.1 X10E3/UL (ref 0–0.4)
EOSINOPHIL NFR BLD AUTO: 1 %
ERYTHROCYTE [DISTWIDTH] IN BLOOD BY AUTOMATED COUNT: 12.7 % (ref 11.6–15.4)
GLOBULIN SER CALC-MCNC: 2.2 G/DL (ref 1.5–4.5)
GLUCOSE SERPL-MCNC: 126 MG/DL (ref 70–99)
HBA1C MFR BLD: 7.4 % (ref 4.8–5.6)
HCT VFR BLD AUTO: 35.1 % (ref 37.5–51)
HDLC SERPL-MCNC: 52 MG/DL
HGB BLD-MCNC: 11.2 G/DL (ref 13–17.7)
IMM GRANULOCYTES # BLD AUTO: 0 X10E3/UL (ref 0–0.1)
IMM GRANULOCYTES NFR BLD AUTO: 0 %
LDLC SERPL CALC-MCNC: 31 MG/DL (ref 0–99)
LPA SERPL-SCNC: 20 NMOL/L
LYMPHOCYTES # BLD AUTO: 0.7 X10E3/UL (ref 0.7–3.1)
LYMPHOCYTES NFR BLD AUTO: 15 %
MCH RBC QN AUTO: 32.7 PG (ref 26.6–33)
MCHC RBC AUTO-ENTMCNC: 31.9 G/DL (ref 31.5–35.7)
MCV RBC AUTO: 102 FL (ref 79–97)
MONOCYTES # BLD AUTO: 0.5 X10E3/UL (ref 0.1–0.9)
MONOCYTES NFR BLD AUTO: 11 %
NEUTROPHILS # BLD AUTO: 3.3 X10E3/UL (ref 1.4–7)
NEUTROPHILS NFR BLD AUTO: 73 %
PLATELET # BLD AUTO: 152 X10E3/UL (ref 150–450)
POTASSIUM SERPL-SCNC: 4.7 MMOL/L (ref 3.5–5.2)
PROT SERPL-MCNC: 6.4 G/DL (ref 6–8.5)
RBC # BLD AUTO: 3.43 X10E6/UL (ref 4.14–5.8)
SODIUM SERPL-SCNC: 143 MMOL/L (ref 134–144)
TRIGL SERPL-MCNC: 69 MG/DL (ref 0–149)
TSH SERPL DL<=0.005 MIU/L-ACNC: 0.3 UIU/ML (ref 0.45–4.5)
VLDLC SERPL CALC-MCNC: 15 MG/DL (ref 5–40)
WBC # BLD AUTO: 4.6 X10E3/UL (ref 3.4–10.8)

## 2025-08-09 LAB — APO B SERPL-MCNC: 40 MG/DL

## 2025-08-11 LAB
PSA SERPL-MCNC: <0.1 NG/ML (ref 0–4)
WRITTEN AUTHORIZATION: NORMAL